# Patient Record
Sex: FEMALE | Race: OTHER | NOT HISPANIC OR LATINO
[De-identification: names, ages, dates, MRNs, and addresses within clinical notes are randomized per-mention and may not be internally consistent; named-entity substitution may affect disease eponyms.]

---

## 2018-11-12 ENCOUNTER — APPOINTMENT (OUTPATIENT)
Dept: ORTHOPEDIC SURGERY | Facility: CLINIC | Age: 48
End: 2018-11-12
Payer: COMMERCIAL

## 2018-11-12 VITALS
BODY MASS INDEX: 33.29 KG/M2 | HEART RATE: 79 BPM | WEIGHT: 195 LBS | DIASTOLIC BLOOD PRESSURE: 84 MMHG | TEMPERATURE: 98.1 F | SYSTOLIC BLOOD PRESSURE: 149 MMHG | HEIGHT: 64 IN

## 2018-11-12 DIAGNOSIS — Z82.61 FAMILY HISTORY OF ARTHRITIS: ICD-10-CM

## 2018-11-12 DIAGNOSIS — Z80.42 FAMILY HISTORY OF MALIGNANT NEOPLASM OF PROSTATE: ICD-10-CM

## 2018-11-12 DIAGNOSIS — Z82.62 FAMILY HISTORY OF OSTEOPOROSIS: ICD-10-CM

## 2018-11-12 DIAGNOSIS — M25.551 PAIN IN RIGHT HIP: ICD-10-CM

## 2018-11-12 DIAGNOSIS — Z86.79 PERSONAL HISTORY OF OTHER DISEASES OF THE CIRCULATORY SYSTEM: ICD-10-CM

## 2018-11-12 DIAGNOSIS — M25.552 PAIN IN RIGHT HIP: ICD-10-CM

## 2018-11-12 DIAGNOSIS — M16.0 BILATERAL PRIMARY OSTEOARTHRITIS OF HIP: ICD-10-CM

## 2018-11-12 PROCEDURE — 99203 OFFICE O/P NEW LOW 30 MIN: CPT | Mod: 25

## 2018-11-12 PROCEDURE — 73523 X-RAY EXAM HIPS BI 5/> VIEWS: CPT | Mod: RT

## 2018-11-12 PROCEDURE — 20610 DRAIN/INJ JOINT/BURSA W/O US: CPT | Mod: LT

## 2018-11-12 RX ORDER — LISINOPRIL 10 MG/1
10 TABLET ORAL
Refills: 0 | Status: ACTIVE | COMMUNITY

## 2018-11-12 RX ORDER — METOPROLOL SUCCINATE 100 MG/1
100 TABLET, EXTENDED RELEASE ORAL
Refills: 0 | Status: ACTIVE | COMMUNITY

## 2018-12-26 ENCOUNTER — OTHER (OUTPATIENT)
Age: 48
End: 2018-12-26

## 2018-12-31 ENCOUNTER — APPOINTMENT (OUTPATIENT)
Dept: MRI IMAGING | Facility: CLINIC | Age: 48
End: 2018-12-31

## 2019-01-09 ENCOUNTER — APPOINTMENT (OUTPATIENT)
Dept: MRI IMAGING | Facility: CLINIC | Age: 49
End: 2019-01-09

## 2019-02-06 ENCOUNTER — APPOINTMENT (OUTPATIENT)
Dept: ORTHOPEDIC SURGERY | Facility: CLINIC | Age: 49
End: 2019-02-06
Payer: COMMERCIAL

## 2019-02-06 VITALS
WEIGHT: 195 LBS | BODY MASS INDEX: 33.29 KG/M2 | DIASTOLIC BLOOD PRESSURE: 85 MMHG | HEART RATE: 68 BPM | HEIGHT: 64 IN | SYSTOLIC BLOOD PRESSURE: 131 MMHG

## 2019-02-06 PROCEDURE — 99214 OFFICE O/P EST MOD 30 MIN: CPT | Mod: 25

## 2019-02-06 PROCEDURE — 20610 DRAIN/INJ JOINT/BURSA W/O US: CPT | Mod: LT

## 2019-02-06 NOTE — ADDENDUM
[FreeTextEntry1] : I, Irish Paul, acted solely as a scribe for Dr. Jann Alaniz on this date 02/06/2019.

## 2019-02-06 NOTE — DISCUSSION/SUMMARY
[de-identified] : 49 year old female presents with mild arthritic change of the bilateral hips and left hip bursitis. We discussed the nature of the condition and the treatment options. The patient will proceed with conservative treatments until they have exhausted and have failed. I believe this would be the best option for long term relief. The patient elected for a left hip cortisone injection, which she received today and tolerated well. I encouraged the patient to continue low impact exercise. I referred the patient to a physiatrist, Dr. Guidry. I ordered an MRI of the lumbar spine. The patient will follow up as needed.

## 2019-02-06 NOTE — HISTORY OF PRESENT ILLNESS
[Pain Location] : pain [Stable] : stable [de-identified] : 49 year old female presents with bilateral hip pain, which started about one year ago. No injury but she was very active. She works as a . The patient used to power walk, but stopped one year ago. The pain is in the lateral hips, left worse then right. She denies groin pain. She has intermittent lower back pain. She describes the pain as an ache, more painful in the evening and upon walking. Rest and ibuprofen provide pain relief. Overuse of the hips, walking, and prolonged sitting aggravate her pain. She notes being unable to cross her legs without pain. She has sleep disturbances intermittently. The hip bursa cortisone injection from last visit provided good pain relief for 4 weeks.

## 2019-02-06 NOTE — PHYSICAL EXAM
[LE] : Sensory: Intact in bilateral lower extremities [ALL] : dorsalis pedis, posterior tibial, femoral, popliteal, and radial 2+ and symmetric bilaterally [Obese] : obese [Normal] : Oriented to person, place, and time, insight and judgement were intact and the affect was normal [Poor Appearance] : well-appearing [Acute Distress] : not in acute distress [de-identified] : GENERAL APPEARANCE: Well nourished and hydrated, pleasant, alert, and oriented x 3. Appears their stated age. \par HEENT: Normocephalic, extraocular eye motion intact. Nasal septum midline. Oral cavity clear. External auditory canal clear. \par RESPIRATORY: Breath sounds clear and audible in all lobes. No wheezing, No accessory muscle use.\par CARDIOVASCULAR: No apparent abnormalities. No lower leg edema. No varicosities. Pedal pulses are palpable.\par NEUROLOGIC: Sensation is normal, no muscle weakness in the upper or lower extremities.\par DERMATOLOGIC: No apparent skin lesions, moist, warm, no rash.\par SPINE: Cervical spine appears normal and moves freely; thoracic spine appears normal and moves freely; lumbosacral spine appears normal and moves freely, normal, nontender.\par MUSCULOSKELETAL: Hands, wrists, and elbows are normal and move freely, shoulders are normal and move freely. [de-identified] : Bilateral hip examination demonstrates FROM without groin pain, focal tenderness and pain over trochanteric bursa.  [de-identified] : MRI of the left hip done at [facility] on 1/28/19 demonstrates superficial greater trochanteric bursitis, insertional tendinosis of the gluteus minimus, small left hip effusion, blunting of the periphery of the superior labrum, atrophic change with signal abnormality in the quadratus femoris and narrowing of the ischial femoral space compatible with ischial femoral impingement, mild tendinosis of the left common hamstring origin, mild degenerative changes of the pubic symphysis, and scattered diverticula.

## 2019-02-06 NOTE — PROCEDURE
[de-identified] : I injected the patient's left hip with cortisone today.\par \par I discussed at length with the patient the planned steroid and lidocaine injection. The risks, benefits, convalescence and alternatives were reviewed. The possible side effects discussed included but were not limited to: pain, swelling, heat, bleeding, and redness. Symptoms are generally mild but if they are extensive then contact the office. Giving pain relievers by mouth such as NSAIDs or Tylenol can generally treat the reactions to steroid and lidocaine. Rare cases of infection have been noted. Rash, hives and itching may occur post injection. If you have muscle pain or cramps, flushing and or swelling of the face, rapid heart beat, nausea, dizziness, fever, chills, headache, difficulty breathing, swelling in the arms or legs, or have a prickly feeling of your skin, contact a health care provider immediately. Following this discussion, the knee was prepped with Alcohol and under sterile condition the 80 mg Depo-Medrol and 6 cc Lidocaine injection was performed with a 20 gauge needle through a superolateral injection site. The needle was introduced into the joint, aspiration was performed to ensure intra-articular placement and the medication was injected. Upon withdrawal of the needle the site was cleaned with alcohol and a band aid applied. The patient tolerated the injection well and there were no adverse effects. Post injection instructions included no strenuous activity for 24 hours, cryotherapy and if there are any adverse effects to contact the office.\par \par

## 2019-04-17 ENCOUNTER — OTHER (OUTPATIENT)
Age: 49
End: 2019-04-17

## 2019-05-22 ENCOUNTER — LABORATORY RESULT (OUTPATIENT)
Age: 49
End: 2019-05-22

## 2019-05-22 ENCOUNTER — APPOINTMENT (OUTPATIENT)
Dept: RHEUMATOLOGY | Facility: CLINIC | Age: 49
End: 2019-05-22
Payer: COMMERCIAL

## 2019-05-22 VITALS
HEART RATE: 82 BPM | SYSTOLIC BLOOD PRESSURE: 137 MMHG | BODY MASS INDEX: 33.29 KG/M2 | WEIGHT: 195 LBS | OXYGEN SATURATION: 97 % | HEIGHT: 64 IN | DIASTOLIC BLOOD PRESSURE: 86 MMHG | TEMPERATURE: 98.7 F

## 2019-05-22 DIAGNOSIS — M25.50 PAIN IN UNSPECIFIED JOINT: ICD-10-CM

## 2019-05-22 DIAGNOSIS — Z80.42 FAMILY HISTORY OF MALIGNANT NEOPLASM OF PROSTATE: ICD-10-CM

## 2019-05-22 PROCEDURE — 99204 OFFICE O/P NEW MOD 45 MIN: CPT

## 2019-05-22 RX ORDER — CHROMIUM 200 MCG
TABLET ORAL
Refills: 0 | Status: ACTIVE | COMMUNITY

## 2019-05-22 NOTE — REVIEW OF SYSTEMS
[Feeling Poorly] : feeling poorly [Feeling Tired] : feeling tired [Arthralgias] : arthralgias [As Noted in HPI] : as noted in HPI [Negative] : Heme/Lymph

## 2019-05-23 ENCOUNTER — APPOINTMENT (OUTPATIENT)
Dept: PHYSICAL MEDICINE AND REHAB | Facility: CLINIC | Age: 49
End: 2019-05-23
Payer: COMMERCIAL

## 2019-05-23 LAB
25(OH)D3 SERPL-MCNC: 19.4 NG/ML
ALBUMIN SERPL ELPH-MCNC: 4.8 G/DL
ALP BLD-CCNC: 40 U/L
ALT SERPL-CCNC: 19 U/L
ANION GAP SERPL CALC-SCNC: 15 MMOL/L
APPEARANCE: CLEAR
AST SERPL-CCNC: 15 U/L
B BURGDOR IGG+IGM SER QL IB: NORMAL
BASOPHILS # BLD AUTO: 0.03 K/UL
BASOPHILS NFR BLD AUTO: 0.4 %
BILIRUB SERPL-MCNC: 0.3 MG/DL
BILIRUBIN URINE: NEGATIVE
BLOOD URINE: NORMAL
BUN SERPL-MCNC: 18 MG/DL
CALCIUM SERPL-MCNC: 10.3 MG/DL
CCP AB SER IA-ACNC: <8 UNITS
CHLORIDE SERPL-SCNC: 103 MMOL/L
CK SERPL-CCNC: 60 U/L
CO2 SERPL-SCNC: 23 MMOL/L
COLOR: NORMAL
CREAT SERPL-MCNC: 0.63 MG/DL
CRP SERPL-MCNC: 0.88 MG/DL
DEPRECATED KAPPA LC FREE/LAMBDA SER: 1.23 RATIO
EOSINOPHIL # BLD AUTO: 0.09 K/UL
EOSINOPHIL NFR BLD AUTO: 1.1 %
ERYTHROCYTE [SEDIMENTATION RATE] IN BLOOD BY WESTERGREN METHOD: 35 MM/HR
FERRITIN SERPL-MCNC: 58 NG/ML
FOLATE SERPL-MCNC: 18.3 NG/ML
GLUCOSE QUALITATIVE U: NEGATIVE
GLUCOSE SERPL-MCNC: 102 MG/DL
HCT VFR BLD CALC: 41.7 %
HCV AB SER QL: NONREACTIVE
HCV S/CO RATIO: 0.1 S/CO
HGB BLD-MCNC: 12.9 G/DL
IGA SER QL IEP: 200 MG/DL
IGG SER QL IEP: 705 MG/DL
IGM SER QL IEP: 236 MG/DL
IMM GRANULOCYTES NFR BLD AUTO: 0.5 %
IRON SATN MFR SERPL: 13 %
IRON SERPL-MCNC: 52 UG/DL
KAPPA LC CSF-MCNC: 0.87 MG/DL
KAPPA LC SERPL-MCNC: 1.07 MG/DL
KETONES URINE: NEGATIVE
LEUKOCYTE ESTERASE URINE: NEGATIVE
LYMPHOCYTES # BLD AUTO: 2.05 K/UL
LYMPHOCYTES NFR BLD AUTO: 25.9 %
MAN DIFF?: NORMAL
MCHC RBC-ENTMCNC: 25.7 PG
MCHC RBC-ENTMCNC: 30.9 GM/DL
MCV RBC AUTO: 83.1 FL
MONOCYTES # BLD AUTO: 0.63 K/UL
MONOCYTES NFR BLD AUTO: 8 %
MYOGLOBIN SERPL-MCNC: <21 NG/ML
NEUTROPHILS # BLD AUTO: 5.06 K/UL
NEUTROPHILS NFR BLD AUTO: 64.1 %
NITRITE URINE: NEGATIVE
PH URINE: 5.5
PLATELET # BLD AUTO: 272 K/UL
POTASSIUM SERPL-SCNC: 3.8 MMOL/L
PROT SERPL-MCNC: 7.1 G/DL
PROTEIN URINE: NEGATIVE
RBC # BLD: 5.02 M/UL
RBC # FLD: 13.8 %
RF+CCP IGG SER-IMP: NEGATIVE
RHEUMATOID FACT SER QL: <10 IU/ML
SODIUM SERPL-SCNC: 141 MMOL/L
SPECIFIC GRAVITY URINE: 1.02
THYROGLOB AB SERPL-ACNC: <20 IU/ML
THYROPEROXIDASE AB SERPL IA-ACNC: <10 IU/ML
TIBC SERPL-MCNC: 387 UG/DL
TSH SERPL-ACNC: 1.73 UIU/ML
UIBC SERPL-MCNC: 335 UG/DL
UROBILINOGEN URINE: NORMAL
VIT B12 SERPL-MCNC: 587 PG/ML
WBC # FLD AUTO: 7.9 K/UL

## 2019-05-23 PROCEDURE — 99214 OFFICE O/P EST MOD 30 MIN: CPT

## 2019-05-23 RX ORDER — MELOXICAM 7.5 MG/1
7.5 TABLET ORAL TWICE DAILY
Qty: 30 | Refills: 0 | Status: COMPLETED | COMMUNITY
Start: 2019-05-23 | End: 2019-05-23

## 2019-05-24 LAB
ENA SS-A AB SER IA-ACNC: <0.2 AL
ENA SS-B AB SER IA-ACNC: <0.2 AL

## 2019-05-27 PROBLEM — M25.50 POLYARTHRALGIA: Status: ACTIVE | Noted: 2019-05-22

## 2019-05-27 LAB
ALDOLASE SERPL-CCNC: 12 U/L
ANA SER IF-ACNC: NEGATIVE
VIT B1 SERPL-MCNC: 104.6 NMOL/L
VIT B6 SERPL-MCNC: 26 UG/L

## 2019-05-27 NOTE — ASSESSMENT
[FreeTextEntry1] : 49 year-old female with sudden onset of bilateral leg pain that is constant and not responding to NSAIDS\par MRI was inconclusive\par She has a hx of carpal tunnel bilaterally\par Add labs for neuropathy X lyme\par Send for EMG\par \par will fu with this clinic after labs and emg are done\par \par does not wish to start any medications at this time - will stop B6 tea to see if symptoms improve

## 2019-05-27 NOTE — HISTORY OF PRESENT ILLNESS
[FreeTextEntry1] : patient developed bilateral leg pain with shooting/burning pain for 3-4 months\par Has numbness and tingling over hands and feet- told to have carpal tunnel in the past over the left hand\par MRi of lumbar spine done in 04/2019 with no stenosis or nerve compression - done at an open MRI machine - has not have any injections or other procedures\par Reports achiness throughout her joints - specially her hands\par Morning stiffness - feels myalgia over bilateral legs - improves slightly \par sometimes feels like her legs are tired. \par hair thinning\par severe fatigue\par weight loss of 15 lbs in a diet\par Patient is taking a herbal tea that contains high doses of B6\par \par Denies any fevers, chill, rashes, weight loss,fatigue,  hair loss,dry eyes or mouth, mouth sores, Raynaud's. chest pain or SOB, GI or \par \par

## 2019-05-27 NOTE — PHYSICAL EXAM
[General Appearance - Alert] : alert [General Appearance - In No Acute Distress] : in no acute distress [Heart Sounds] : normal S1 and S2 [Auscultation Breath Sounds / Voice Sounds] : lungs were clear to auscultation bilaterally [Heart Rate And Rhythm] : heart rate was normal and rhythm regular [Heart Sounds Gallop] : no gallops [Murmurs] : no murmurs [Heart Sounds Pericardial Friction Rub] : no pericardial rub [Full Pulse] : the pedal pulses are present [Bowel Sounds] : normal bowel sounds [Edema] : there was no peripheral edema [Abdomen Mass (___ Cm)] : no abdominal mass palpated [Abdomen Soft] : soft [Abdomen Tenderness] : non-tender [Cervical Lymph Nodes Enlarged Anterior Bilaterally] : anterior cervical [Cervical Lymph Nodes Enlarged Posterior Bilaterally] : posterior cervical [Supraclavicular Lymph Nodes Enlarged Bilaterally] : supraclavicular [Axillary Lymph Nodes Enlarged Bilaterally] : axillary [Femoral Lymph Nodes Enlarged Bilaterally] : femoral [Inguinal Lymph Nodes Enlarged Bilaterally] : inguinal [No CVA Tenderness] : no ~M costovertebral angle tenderness [Skin Turgor] : normal skin turgor [] : no rash [Skin Color & Pigmentation] : normal skin color and pigmentation [FreeTextEntry1] : right leg with mild weakness [Oriented To Time, Place, And Person] : oriented to person, place, and time [Affect] : the affect was normal [Impaired Insight] : insight and judgment were intact

## 2019-05-28 NOTE — ASSESSMENT
[FreeTextEntry1] : Ms. PHOENIX is a 49 year old with initially bilateral hip pain from bursitis - treated with hip injections with ortho.  with progressive development of lumbar back pain with radiculopathy to bilateral left > right extremity pain mostly along L5-S1 distribution.  Recommend conservative care with PT and diclofenac BID. In regards to her paresthesias in her UE, likely related to CTS, consider EMG for further diagnostic testing. Follow up with rheumatology regarding recent workup. Follow up after therapy.\par

## 2019-05-28 NOTE — PHYSICAL EXAM
[FreeTextEntry1] : General: Awake and alert in no acute distress\par Psych: normal mood and affect\par HEENT: NC/AT, normal visual tracking\par Pulmonary: no resp distress, chest expansion appears symmetrical\par CV: extremities are warm and perfused\par Abd: non-distended\par Ext: no c/c/e\par \par Lumbar/Hip Spine:\par Gait - non-antalgic\par Inspection: normal muscle bulk without asymmetry\par Tenderness to palpation: TTP to lumbar paraspinal, minimal over greater troch, no TTP over PSIS, sacroiliac joints, piriformis\par ROM: within functional limits - discomfort with flexion\par MMT: 5/5 bilateral lower extremities (HF, KE, KF, DF, PF, EHL) - pain with bilateral HF \par Reflexes: symmetric bilateral achilles and patella \par Sensory: intact to light touch in all dermatomes of the bilateral lower extremities\par Provocative testing:\par Clements's - positive \par SLR - negative - lower back discomfort\par GODWIN/FADIR - negative - right GODWIN with hip pain\par \par Cervical Spine/Shoulder:\par Inspection: normal muscle bulk without asymmetry\par Tenderness to palpation: no TTP bilateral thoracic paraspinals, medical scapular border, levator scapulae, upper trapezius, rhomboids, spinous process, AC joint, subacromial, biceps groove\par ROM: within functional limits\par MMT: 5/5 bilateral upper extremities\par Reflexes: symmetric bilateral biceps, triceps \par Sensory: intact to light touch in all dermatomes of the bilateral upper extremities\par Provocative testing:\par Spurling - negative\par Jean - negative

## 2019-05-28 NOTE — HISTORY OF PRESENT ILLNESS
[FreeTextEntry1] : Ms. JONNA PHOENIX is a 49 year old female here for initial evaluation of back pain.  Pain started 1 year ago without any inciting event.  Initially pain was only to bilateral hips to which she saw ortho and had injections to treat bursitis which helped.  However, 6 month ago pain started to radiate down bilateral left > right lower extremities along with some back pain.  Pain mostly to lateral thighs, occasionally to anterior aspect of thigh, poster calves and dorsum feet.  Pain to thighs described as a "zapping" and pulling, while feet pain is a burning and swelling numbness.  Bilateral thighs would feel fatigue at times without presence of weakness and denies fall history.  Pain worse with bending and walking, improves with stretching and sitting. have only been taking advil PRN at bedtime for pain.\par \par Subsequently, she has bilateral palm and finger numbness to nonspecific areas.  She consulted with rheum on 5/22 to r/o autoimmune disorders.  \par \par Location: bilateral (left >right) legs > back\par Inciting Event: none\par Exacerbating Factor: bending, walking\par Relieving factor: sit, rest\par Radiation: bilateral left> right legs\par Numbness/Tingling: bilateral feet\par Cough/sneeze: increase pain\par Bowel/Bladder incontinence:\par Extremity weakness: denies\par \par Prior Treatments\par Injections: bilateral hips for bursitis\par PT/OT: denies\par Images: 4/23/19 MRI [facility] - herniated disc to T11-12 and L5/S1, grade I listhesis at L4/5, and facet hypertrophy to L2-L5

## 2019-08-29 ENCOUNTER — APPOINTMENT (OUTPATIENT)
Dept: PHYSICAL MEDICINE AND REHAB | Facility: CLINIC | Age: 49
End: 2019-08-29

## 2019-10-02 ENCOUNTER — APPOINTMENT (OUTPATIENT)
Dept: ORTHOPEDIC SURGERY | Facility: CLINIC | Age: 49
End: 2019-10-02

## 2019-10-03 ENCOUNTER — APPOINTMENT (OUTPATIENT)
Dept: PHYSICAL MEDICINE AND REHAB | Facility: CLINIC | Age: 49
End: 2019-10-03
Payer: COMMERCIAL

## 2019-10-03 VITALS
HEIGHT: 64 IN | DIASTOLIC BLOOD PRESSURE: 72 MMHG | BODY MASS INDEX: 33.29 KG/M2 | WEIGHT: 195 LBS | SYSTOLIC BLOOD PRESSURE: 106 MMHG

## 2019-10-03 DIAGNOSIS — M54.16 RADICULOPATHY, LUMBAR REGION: ICD-10-CM

## 2019-10-03 DIAGNOSIS — G62.9 POLYNEUROPATHY, UNSPECIFIED: ICD-10-CM

## 2019-10-03 DIAGNOSIS — M70.62 TROCHANTERIC BURSITIS, RIGHT HIP: ICD-10-CM

## 2019-10-03 DIAGNOSIS — M70.61 TROCHANTERIC BURSITIS, RIGHT HIP: ICD-10-CM

## 2019-10-03 PROCEDURE — 99214 OFFICE O/P EST MOD 30 MIN: CPT

## 2019-10-03 RX ORDER — DICLOFENAC SODIUM 75 MG/1
75 TABLET, DELAYED RELEASE ORAL
Qty: 30 | Refills: 0 | Status: DISCONTINUED | COMMUNITY
Start: 2019-05-23 | End: 2019-10-03

## 2019-10-09 PROBLEM — G62.9 NEUROPATHY: Status: ACTIVE | Noted: 2019-05-22

## 2019-10-09 PROBLEM — M70.61 TROCHANTERIC BURSITIS OF BOTH HIPS: Status: ACTIVE | Noted: 2018-11-12

## 2019-10-09 PROBLEM — M54.16 LUMBAR BACK PAIN WITH RADICULOPATHY AFFECTING LEFT LOWER EXTREMITY: Status: ACTIVE | Noted: 2019-02-06

## 2019-10-09 NOTE — PHYSICAL EXAM
[FreeTextEntry1] : General: Awake and alert in no acute distress\par Psych: normal mood and affect\par HEENT: NC/AT, normal visual tracking\par Pulmonary: no resp distress, chest expansion appears symmetrical\par CV: extremities are warm and perfused\par Abd: non-distended\par Ext: no c/c/e\par Skin: normal color, appearance, and temperature\par \par Lumbar/Hip Spine:\par Gait - non-antalgic\par Inspection: normal muscle bulk without asymmetry\par Tenderness to palpation: TTP over bilateral lumbar paraspinal, greater trochanter, no TTP over PSIS, sacroiliac joints, piriformis\par ROM: within functional limits - discomfort with flexion\par MMT: 5/5 bilateral lower extremities (HF, KE, KF, DF, PF, EHL) - pain with bilateral HF \par Reflexes: symmetric bilateral achilles and patella \par Sensory: intact to light touch in all dermatomes of the bilateral lower extremities\par Provocative testing:\par Clements's - positive \par SLR - negative - discomfort to low back\par GODWIN/FADIR - negative - right GODWIN with hip pain

## 2019-10-09 NOTE — HISTORY OF PRESENT ILLNESS
[FreeTextEntry1] : Ms. JONNA PHOENIX is a 49 year old female here for follow up of back pain.  She was doing well over  then summer, however she reports experiencing debilitating pain on 8/25.  She was moving washer/dryer 2 days prior to onset.  Have been taking advil/motrin PRN.  Tried diclofenac with SE of fogginess and some nausea. She's complaining of constant pain and worse at night where she gets shooting pain to bilateral lateral thighs.  Unable to sleep 2/2 pain.  Has history of bilateral hip bursitis with relief after bursa injection.  Denies weakness, B/B incontinence, or saddle anesthesia.\par \par Location: bilateral legs > back\par Inciting Event: denies initial inciting event or injury - recent exacerbation elicit after pushing heavy object\par Exacerbating Factor: sitting, rest, walking, bending\par Relieving factor: NSAIDS\par Radiation: latera aspect of bilateral left> right thighs - worse in PM\par Numbness/Tingling: toes - bilaterally.  numbness to fingers - 2/2 CTS\par Cough/sneeze: increase pain\par Bowel/Bladder incontinence: denies\par Extremity weakness: denies\par \par Prior Treatments\par Injections: bilateral hips for bursitis\par PT/OT: denies\par Medication: NSAIDS PRN\par Images: 4/23/19 MRI [facility] - herniated disc to T11-12 and L5/S1, grade I listhesis at L4/5, and facet hypertrophy to L2-L5

## 2019-10-09 NOTE — ASSESSMENT
[FreeTextEntry1] : Ms. PHOENIX is a 49 year old with low back and bilateral hip pain - likely from lumbar radiculopathy to bilateral L5 nerve root.  She also has history of bilateral hip bursitis which may be contributing to some of pain. She had bilateral bursa injections with improvement.  She would like to continue with conservative management and overall holistic approach. Recommend starting physical therapy.  Referral for acupuncture provided.  In terms of medication, she can take NSAIDS PRN and agree to try neuropathic agent to help with nocturnal leg pain.  Gabapentin 300mg QHS order - medication use and side effect reviewed. Ordered for diagnostic EMG of bilateral lower extremity to further evaluate radiculitis and neuropathy.  Follow up after EMG and PT. \par

## 2019-10-25 ENCOUNTER — RX RENEWAL (OUTPATIENT)
Age: 49
End: 2019-10-25

## 2019-11-05 ENCOUNTER — APPOINTMENT (OUTPATIENT)
Dept: NEUROLOGY | Facility: CLINIC | Age: 49
End: 2019-11-05

## 2021-01-05 ENCOUNTER — APPOINTMENT (OUTPATIENT)
Dept: ANESTHESIOLOGY | Facility: CLINIC | Age: 51
End: 2021-01-05

## 2021-01-05 ENCOUNTER — OUTPATIENT (OUTPATIENT)
Dept: OUTPATIENT SERVICES | Facility: HOSPITAL | Age: 51
LOS: 1 days | End: 2021-01-05
Payer: COMMERCIAL

## 2021-01-05 DIAGNOSIS — M47.16 OTHER SPONDYLOSIS WITH MYELOPATHY, LUMBAR REGION: ICD-10-CM

## 2021-01-05 PROCEDURE — 64493 INJ PARAVERT F JNT L/S 1 LEV: CPT

## 2021-01-05 PROCEDURE — 64494 INJ PARAVERT F JNT L/S 2 LEV: CPT

## 2021-02-02 ENCOUNTER — INPATIENT (INPATIENT)
Facility: HOSPITAL | Age: 51
LOS: 3 days | Discharge: HOME | DRG: 376 | End: 2021-02-06
Attending: STUDENT IN AN ORGANIZED HEALTH CARE EDUCATION/TRAINING PROGRAM | Admitting: STUDENT IN AN ORGANIZED HEALTH CARE EDUCATION/TRAINING PROGRAM
Payer: COMMERCIAL

## 2021-02-02 VITALS
TEMPERATURE: 99 F | HEIGHT: 64 IN | DIASTOLIC BLOOD PRESSURE: 84 MMHG | WEIGHT: 197.98 LBS | HEART RATE: 95 BPM | OXYGEN SATURATION: 98 % | SYSTOLIC BLOOD PRESSURE: 125 MMHG | RESPIRATION RATE: 18 BRPM

## 2021-02-02 DIAGNOSIS — Z98.891 HISTORY OF UTERINE SCAR FROM PREVIOUS SURGERY: Chronic | ICD-10-CM

## 2021-02-02 DIAGNOSIS — Z90.49 ACQUIRED ABSENCE OF OTHER SPECIFIED PARTS OF DIGESTIVE TRACT: Chronic | ICD-10-CM

## 2021-02-02 DIAGNOSIS — K56.609 UNSPECIFIED INTESTINAL OBSTRUCTION, UNSPECIFIED AS TO PARTIAL VERSUS COMPLETE OBSTRUCTION: ICD-10-CM

## 2021-02-02 LAB
ALBUMIN SERPL ELPH-MCNC: 4.4 G/DL — SIGNIFICANT CHANGE UP (ref 3.3–5)
ALP SERPL-CCNC: 46 U/L — SIGNIFICANT CHANGE UP (ref 40–120)
ALT FLD-CCNC: 26 U/L — SIGNIFICANT CHANGE UP (ref 10–45)
ANION GAP SERPL CALC-SCNC: 15 MMOL/L — SIGNIFICANT CHANGE UP (ref 5–17)
APPEARANCE UR: CLEAR — SIGNIFICANT CHANGE UP
AST SERPL-CCNC: 18 U/L — SIGNIFICANT CHANGE UP (ref 10–40)
BACTERIA # UR AUTO: NEGATIVE — SIGNIFICANT CHANGE UP
BASOPHILS # BLD AUTO: 0.02 K/UL — SIGNIFICANT CHANGE UP (ref 0–0.2)
BASOPHILS NFR BLD AUTO: 0.2 % — SIGNIFICANT CHANGE UP (ref 0–2)
BILIRUB SERPL-MCNC: 0.3 MG/DL — SIGNIFICANT CHANGE UP (ref 0.2–1.2)
BILIRUB UR-MCNC: NEGATIVE — SIGNIFICANT CHANGE UP
BUN SERPL-MCNC: 16 MG/DL — SIGNIFICANT CHANGE UP (ref 7–23)
CALCIUM SERPL-MCNC: 9.6 MG/DL — SIGNIFICANT CHANGE UP (ref 8.4–10.5)
CHLORIDE SERPL-SCNC: 104 MMOL/L — SIGNIFICANT CHANGE UP (ref 96–108)
CO2 SERPL-SCNC: 20 MMOL/L — LOW (ref 22–31)
COLOR SPEC: SIGNIFICANT CHANGE UP
CREAT SERPL-MCNC: 0.59 MG/DL — SIGNIFICANT CHANGE UP (ref 0.5–1.3)
DIFF PNL FLD: ABNORMAL
EOSINOPHIL # BLD AUTO: 0.03 K/UL — SIGNIFICANT CHANGE UP (ref 0–0.5)
EOSINOPHIL NFR BLD AUTO: 0.4 % — SIGNIFICANT CHANGE UP (ref 0–6)
EPI CELLS # UR: 2 /HPF — SIGNIFICANT CHANGE UP
GLUCOSE SERPL-MCNC: 113 MG/DL — HIGH (ref 70–99)
GLUCOSE UR QL: NEGATIVE — SIGNIFICANT CHANGE UP
HCG SERPL-ACNC: <2 MIU/ML — SIGNIFICANT CHANGE UP
HCT VFR BLD CALC: 46.4 % — HIGH (ref 34.5–45)
HGB BLD-MCNC: 14.3 G/DL — SIGNIFICANT CHANGE UP (ref 11.5–15.5)
HYALINE CASTS # UR AUTO: 0 /LPF — SIGNIFICANT CHANGE UP (ref 0–7)
IMM GRANULOCYTES NFR BLD AUTO: 0.4 % — SIGNIFICANT CHANGE UP (ref 0–1.5)
KETONES UR-MCNC: SIGNIFICANT CHANGE UP
LEUKOCYTE ESTERASE UR-ACNC: NEGATIVE — SIGNIFICANT CHANGE UP
LYMPHOCYTES # BLD AUTO: 1.12 K/UL — SIGNIFICANT CHANGE UP (ref 1–3.3)
LYMPHOCYTES # BLD AUTO: 13.7 % — SIGNIFICANT CHANGE UP (ref 13–44)
MAGNESIUM SERPL-MCNC: 2.4 MG/DL — SIGNIFICANT CHANGE UP (ref 1.6–2.6)
MCHC RBC-ENTMCNC: 25 PG — LOW (ref 27–34)
MCHC RBC-ENTMCNC: 30.8 GM/DL — LOW (ref 32–36)
MCV RBC AUTO: 81.1 FL — SIGNIFICANT CHANGE UP (ref 80–100)
MONOCYTES # BLD AUTO: 0.46 K/UL — SIGNIFICANT CHANGE UP (ref 0–0.9)
MONOCYTES NFR BLD AUTO: 5.6 % — SIGNIFICANT CHANGE UP (ref 2–14)
NEUTROPHILS # BLD AUTO: 6.5 K/UL — SIGNIFICANT CHANGE UP (ref 1.8–7.4)
NEUTROPHILS NFR BLD AUTO: 79.7 % — HIGH (ref 43–77)
NITRITE UR-MCNC: NEGATIVE — SIGNIFICANT CHANGE UP
NRBC # BLD: 0 /100 WBCS — SIGNIFICANT CHANGE UP (ref 0–0)
PH UR: 5.5 — SIGNIFICANT CHANGE UP (ref 5–8)
PLATELET # BLD AUTO: 259 K/UL — SIGNIFICANT CHANGE UP (ref 150–400)
POTASSIUM SERPL-MCNC: 3.9 MMOL/L — SIGNIFICANT CHANGE UP (ref 3.5–5.3)
POTASSIUM SERPL-SCNC: 3.9 MMOL/L — SIGNIFICANT CHANGE UP (ref 3.5–5.3)
PROT SERPL-MCNC: 7.7 G/DL — SIGNIFICANT CHANGE UP (ref 6–8.3)
PROT UR-MCNC: NEGATIVE — SIGNIFICANT CHANGE UP
RBC # BLD: 5.72 M/UL — HIGH (ref 3.8–5.2)
RBC # FLD: 14.7 % — HIGH (ref 10.3–14.5)
RBC CASTS # UR COMP ASSIST: 1 /HPF — SIGNIFICANT CHANGE UP (ref 0–4)
SODIUM SERPL-SCNC: 139 MMOL/L — SIGNIFICANT CHANGE UP (ref 135–145)
SP GR SPEC: 1.02 — SIGNIFICANT CHANGE UP (ref 1.01–1.02)
UROBILINOGEN FLD QL: NEGATIVE — SIGNIFICANT CHANGE UP
WBC # BLD: 8.16 K/UL — SIGNIFICANT CHANGE UP (ref 3.8–10.5)
WBC # FLD AUTO: 8.16 K/UL — SIGNIFICANT CHANGE UP (ref 3.8–10.5)
WBC UR QL: 2 /HPF — SIGNIFICANT CHANGE UP (ref 0–5)

## 2021-02-02 PROCEDURE — 99285 EMERGENCY DEPT VISIT HI MDM: CPT

## 2021-02-02 PROCEDURE — 74177 CT ABD & PELVIS W/CONTRAST: CPT | Mod: 26,MA

## 2021-02-02 RX ORDER — LIDOCAINE 4 G/100G
10 CREAM TOPICAL ONCE
Refills: 0 | Status: COMPLETED | OUTPATIENT
Start: 2021-02-02 | End: 2021-02-02

## 2021-02-02 RX ORDER — METOPROLOL TARTRATE 50 MG
1 TABLET ORAL
Qty: 0 | Refills: 0 | DISCHARGE

## 2021-02-02 RX ORDER — LOSARTAN POTASSIUM 100 MG/1
1 TABLET, FILM COATED ORAL
Qty: 0 | Refills: 0 | DISCHARGE

## 2021-02-02 RX ORDER — ONDANSETRON 8 MG/1
4 TABLET, FILM COATED ORAL ONCE
Refills: 0 | Status: COMPLETED | OUTPATIENT
Start: 2021-02-02 | End: 2021-02-02

## 2021-02-02 RX ORDER — ACETAMINOPHEN 500 MG
975 TABLET ORAL ONCE
Refills: 0 | Status: COMPLETED | OUTPATIENT
Start: 2021-02-02 | End: 2021-02-02

## 2021-02-02 RX ORDER — FAMOTIDINE 10 MG/ML
20 INJECTION INTRAVENOUS ONCE
Refills: 0 | Status: COMPLETED | OUTPATIENT
Start: 2021-02-02 | End: 2021-02-02

## 2021-02-02 RX ADMIN — FAMOTIDINE 20 MILLIGRAM(S): 10 INJECTION INTRAVENOUS at 19:03

## 2021-02-02 RX ADMIN — ONDANSETRON 4 MILLIGRAM(S): 8 TABLET, FILM COATED ORAL at 19:00

## 2021-02-02 RX ADMIN — Medication 975 MILLIGRAM(S): at 19:42

## 2021-02-02 RX ADMIN — LIDOCAINE 10 MILLILITER(S): 4 CREAM TOPICAL at 19:07

## 2021-02-02 NOTE — ED PROVIDER NOTE - OBJECTIVE STATEMENT
51F with PMHx of HTN, and chronic constipation presents with abd pain associated with one episode of emesis this morning. Reports two months of vague, generalized, non-radiating, mostly upper abdominal pain. Has seen GI at outpatient facility with recent CT abd/pelvis that was remarkable for large stool burden. Was started on a bowel regimen 5 weeks ago. BID Murelax, enema two times a week alternating with suppository. PT presents to ER today with worsening epigastric pain that is constant with intermittent cramping associated with burning sensation of chest provoked by eating. Has been endorsing bowel regimen, however, states she feel like she still has stool burden. Spoke to GI physician who instructed pt to come to ER. Denies fever, chills, melena, hematochezia, increase in diarrhea (baseline is soft stool due to regimen), SOB, URI sx, Urinary sx, or abnormal vaginal bleeding. LMP: 3 weeks ago. PT is perimenopausal. Of note, pt has colonoscopy and endoscopy scheduled next week. 51F with PMHx of HTN, and chronic constipation presents with abd pain associated with one episode of emesis this morning. Reports two months of vague, generalized, non-radiating, mostly upper abdominal pain. Has seen GI at outpatient facility with recent CT abd/pelvis that was remarkable for large stool burden. Was started on a bowel regimen 5 weeks ago. BID Murelax, enema two times a week alternating with suppository. PT presents to ER today with worsening epigastric pain that is constant with intermittent cramping associated with burning sensation of chest provoked by eating. Has been endorsing bowel regimen, however, states she feel like she still has stool burden. Spoke to GI physician who instructed pt to come to ER. Denies fever, chills, melena, hematochezia, increase in diarrhea (baseline is soft stool due to regimen), SOB, URI sx, Urinary sx, or abnormal vaginal bleeding, dysphagia, odynophagia. LMP: 3 weeks ago. PT is perimenopausal. Of note, pt has colonoscopy and endoscopy scheduled next week.

## 2021-02-02 NOTE — ED ADULT NURSE REASSESSMENT NOTE - NS ED NURSE REASSESS COMMENT FT1
Patient resting in bed, states abdominal pain is 6/10 better then earlier, pain medication administered as per MD order. No questions or concerns as this time, pending CT scan. Will continue to reassess. Call bell with in reach.

## 2021-02-02 NOTE — ED PROVIDER NOTE - CLINICAL SUMMARY MEDICAL DECISION MAKING FREE TEXT BOX
51F with chronic abd pain, and constipation presents with worsening abd pain, epigastric pain that is described as burning sensation radiating to chest, worsened with food. Notes abd pain with one episode of emesis. Hx of cholecystectomy. Will r/o SBO, and pancreatitis. Do not suspect aortic dissection for atypical acs at this time. Will obtain CT abd/pelvis with PO contrast, GI cocktail, and reevaluate.

## 2021-02-02 NOTE — H&P ADULT - NSHPPHYSICALEXAM_GEN_ALL_CORE
PHYSICAL EXAM:  GENERAL: NAD  HEAD:  Atraumatic, Normocephalic  EYES: EOMI, PERRLA  NECK: Supple  CHEST/LUNG: Unlabored respirations  HEART: Regular rate and rhythm  ABDOMEN: Softly distended, LUQ TTP. No rebound or guarding.   NEURO: A&Ox3

## 2021-02-02 NOTE — ED PROVIDER NOTE - PRO INTERPRETER NEED 2
" EMERGENCY DEPARTMENT ENCOUNTER    CHIEF COMPLAINT  Chief Complaint: SOA  History given by: pt  History limited by: nothing  Room Number: 26/26  PMD: Melina Kothari MD      HPI:  Pt is a 31 y.o. male with h/o SVC [had surgery on 05/17/18] who presents complaining of SOA that began a \"few\" weeks ago. Pt also complains of a cough, intermittent palpitations that lasted for \"two to three seconds\" that worsens when laying down, and chest tightness. Pt denies diarrhea, fever, or chills. Pt was seen in the [facility] ED on 06/11/18 and the CT showed a pericardial effusion. Pt is currently on Lovenox and 81 mg ASA. Pt is also using his incentive spirometer. Pt has no other complaints at this time.    Duration: Began a \"few\" weeks ago  Onset: gradual  Timing: constant  Quality: SOA  Intensity/Severity: moderate  Associated Symptoms: cough, intermittent palpitations, and chest tightness  Treatment before arrival: Pt was seen in the [facility] ED on 06/11/18 and the CT showed a pericardial effusion. Pt is currently on Lovenox and 81 mg ASA. Pt is also using his incentive spirometer.    PAST MEDICAL HISTORY  Active Ambulatory Problems     Diagnosis Date Noted   • SVC syndrome 02/25/2017   • Diffuse large B cell lymphoma 02/26/2017   • Balance problem 08/21/2017   • Neuropathy 08/21/2017   • Blurry vision, bilateral 09/11/2017   • Generalized anxiety disorder 09/11/2017   • Seasonal allergic rhinitis 09/14/2017   • Primary hypothyroidism 10/12/2017   • Cold intolerance 10/12/2017   • Chronic fatigue 10/12/2017   • Primary insomnia 10/13/2017     Resolved Ambulatory Problems     Diagnosis Date Noted   • No Resolved Ambulatory Problems     Past Medical History:   Diagnosis Date   • Anxiety    • GERD (gastroesophageal reflux disease)    • Hypothyroidism    • Lymphoma    • Superior vena cava syndrome    • TIA (transient ischemic attack)        PAST SURGICAL HISTORY  Past Surgical History:   Procedure " Laterality Date   • BRACHIOCEPHALIC VEIN ANGIOPLASTY / STENTING Bilateral     no stents were able to be placed   • BYPASS GRAFT  05/17/2018    Heart surgery bypass; Left IJ to right atrium   • CHEST TUBE INSERTION Right     after thoracentesis   • EXCISION MASS HEAD/NECK Left 2/27/2017    Procedure: Excisional biopsy of left occipital lymph node;  Surgeon: Catalino Damon MD;  Location: [facility];  Service:    • RIB BIOPSY Right 2011    MEDIASTINUM   • SKIN BIOPSY      cyst on left shoulder, benign   • THORACENTESIS Right     thora drain left in for 6 months       FAMILY HISTORY  Family History   Problem Relation Age of Onset   • Cancer Maternal Grandmother    • Cancer Maternal Grandfather    • Cancer Paternal Grandmother    • Diabetes Paternal Grandmother    • Cancer Paternal Grandfather        SOCIAL HISTORY  Social History     Social History   • Marital status: Single     Spouse name: N/A   • Number of children: N/A   • Years of education: College     Occupational History   • Physical therapy tech Other [employer]     Social History Main Topics   • Smoking status: Never Smoker   • Smokeless tobacco: Never Used   • Alcohol use Yes      Comment: occasional, once a month   • Drug use: No   • Sexual activity: Yes     Partners: Male     Other Topics Concern   • Not on file     Social History Narrative    Lives at home with his dog.  Works as a Physical Therapist.         ALLERGIES  Contrast dye and Zoloft [sertraline hcl]    REVIEW OF SYSTEMS  Review of Systems   Constitutional: Negative for activity change, appetite change, chills and fever.   HENT: Negative for congestion and sore throat.    Eyes: Negative.    Respiratory: Positive for cough, chest tightness and shortness of breath.    Cardiovascular: Positive for palpitations (intermittent). Negative for chest pain and leg swelling.   Gastrointestinal: Negative for abdominal pain, diarrhea and vomiting.   Endocrine: Negative.    Genitourinary:  Negative for decreased urine volume and dysuria.   Musculoskeletal: Negative for neck pain.   Skin: Negative for rash and wound.   Allergic/Immunologic: Negative.    Neurological: Negative for weakness, numbness and headaches.   Hematological: Negative.    Psychiatric/Behavioral: Negative.    All other systems reviewed and are negative.      PHYSICAL EXAM  ED Triage Vitals [06/19/18 0802]   Temp Heart Rate Resp BP SpO2   96.8 °F (36 °C) 116 20 -- 98 %      Temp src Heart Rate Source Patient Position BP Location FiO2 (%)   Tympanic -- -- -- --       Physical Exam   Constitutional: He is oriented to person, place, and time. No distress.   HENT:   Head: Normocephalic and atraumatic.   Eyes: EOM are normal. Pupils are equal, round, and reactive to light.   Neck: Normal range of motion. Neck supple. No JVD present.   Cardiovascular: Normal rate, regular rhythm and normal heart sounds.    The medial incision looks unremarkable.   Pulmonary/Chest: Effort normal and breath sounds normal. No respiratory distress.   Abdominal: Soft. There is no tenderness. There is no rebound and no guarding.   Musculoskeletal: Normal range of motion. He exhibits no edema.   Neurological: He is alert and oriented to person, place, and time. He has normal sensation and normal strength.   Skin: Skin is warm and dry.   Psychiatric: Mood and affect normal.   Nursing note and vitals reviewed.      LAB RESULTS  Lab Results (last 24 hours)     Procedure Component Value Units Date/Time    Protime-INR [[id number]]  (Abnormal) Collected:  06/19/18 0841    Specimen:  Blood Updated:  06/19/18 0900     Protime 14.2 Seconds      INR 1.12 (H)    aPTT [[id number]]  (Abnormal) Collected:  06/19/18 0841    Specimen:  Blood Updated:  06/19/18 0900     PTT 47.9 (H) seconds     CBC & Differential [[id number]] Collected:  06/19/18 0842    Specimen:  Blood Updated:  06/19/18 0853    Narrative:       The following orders were created for panel order CBC &  Differential.  Procedure                               Abnormality         Status                     ---------                               -----------         ------                     CBC Auto Differential[[id number]]        Abnormal            Final result                 Please view results for these tests on the individual orders.    Comprehensive Metabolic Panel [[id number]]  (Abnormal) Collected:  06/19/18 0842    Specimen:  Blood Updated:  06/19/18 0907     Glucose 106 (H) mg/dL      BUN 15 mg/dL      Creatinine 0.97 mg/dL      Sodium 139 mmol/L      Potassium 4.0 mmol/L      Chloride 101 mmol/L      CO2 25.8 mmol/L      Calcium 9.5 mg/dL      Total Protein 7.7 g/dL      Albumin 4.00 g/dL      ALT (SGPT) 22 U/L      AST (SGOT) 15 U/L      Alkaline Phosphatase 86 U/L      Total Bilirubin 0.3 mg/dL      eGFR Non African Amer 89 mL/min/1.73      Globulin 3.7 gm/dL      A/G Ratio 1.1 g/dL      BUN/Creatinine Ratio 15.5     Anion Gap 12.2 mmol/L     BNP [[id number]]  (Normal) Collected:  06/19/18 0842    Specimen:  Blood Updated:  06/19/18 0905     proBNP 321.3 pg/mL     Narrative:       Among patients with dyspnea, NT-proBNP is highly sensitive for the detection of acute congestive heart failure. In addition NT-proBNP of <300 pg/ml effectively rules out acute congestive heart failure with 99% negative predictive value.    Troponin [[id number]]  (Normal) Collected:  06/19/18 0842    Specimen:  Blood Updated:  06/19/18 0907     Troponin T <0.010 ng/mL     Narrative:       Troponin T Reference Ranges:  Less than 0.03 ng/mL:    Negative for AMI  0.03 to 0.09 ng/mL:      Indeterminant for AMI  Greater than 0.09 ng/mL: Positive for AMI    CBC Auto Differential [[id number]]  (Abnormal) Collected:  06/19/18 0842    Specimen:  Blood Updated:  06/19/18 0853     WBC 5.44 10*3/mm3      RBC 4.75 10*6/mm3      Hemoglobin 12.7 (L) g/dL      Hematocrit 39.4 (L) %      MCV 82.9 fL      MCH 26.7 (L) pg      MCHC 32.2 (L) g/dL       RDW 13.6 %      RDW-SD 41.5 fl      MPV 9.1 fL      Platelets 260 10*3/mm3      Neutrophil % 54.2 %      Lymphocyte % 23.9 %      Monocyte % 6.8 %      Eosinophil % 13.6 (H) %      Basophil % 1.5 %      Immature Grans % 1.3 (H) %      Neutrophils, Absolute 2.95 10*3/mm3      Lymphocytes, Absolute 1.30 10*3/mm3      Monocytes, Absolute 0.37 10*3/mm3      Eosinophils, Absolute 0.74 (H) 10*3/mm3      Basophils, Absolute 0.08 10*3/mm3      Immature Grans, Absolute 0.07 (H) 10*3/mm3           I ordered the above labs and reviewed the results    RADIOLOGY  XR Chest 1 View   Final Result   The right lung is clear. There is cardiac enlargement. There are median sternotomy wires and vascular markers in position. There is opacity at the left lung base with blunting of the costophrenic angle consistent with left-sided pleural effusion with perhaps associated atelectasis/infiltrate. No pulmonary edema identified. There are monitoring leads superimposing the chest. The mediastinum is stable. The remainder is unremarkable.        I ordered the above noted radiological studies. Interpreted by radiologist. Reviewed by me in PACS.       PROCEDURES  Procedures  EKG           EKG time: 0853  Rhythm/Rate: NSR, 90  P waves and NH: nml; nml  QRS, axis: nml; nml   ST and T waves: nml     Interpreted Contemporaneously by me, independently viewed with no prior for comparison.    PROGRESS AND CONSULTS  0832 Ordered CXR, Transthoracic Echo, and blood work for further evaluation. Also ordered IVF for hydration.    0844 Received a call from Dr. Renteria, cardiology, and discussed pt's case. Dr. Renteria agreed with plan to order a transthoracic echo for further evaluation.    1110 Received a call from Dr. Renteria, cardiology, and discussed pt's case. Dr. Renteria stated that the Echo showed a small pericardial effusion. Advised that the pt should f/u in the office next week.    1112 Rechecked pt who is in NAD. Discussed unremarkable lab  results and the Echo which showed a small pericardial effusion. Also dicussed the plan for discharge. Advised the pt to f/u with a cardiologist. Pt understands and agrees with the plan, all questions answered.      MEDICAL DECISION MAKING  Results were reviewed/discussed with the patient and they were also made aware of online access. Pt also made aware that some labs, such as cultures, will not be resulted during ER visit and follow up with PMD is necessary.     MDM  Number of Diagnoses or Management Options     Amount and/or Complexity of Data Reviewed  Clinical lab tests: reviewed and ordered (Unremarkable)  Tests in the radiology section of CPT®: ordered and reviewed (CXR shows that the right lung is clear. There is cardiac enlargement. There are median sternotomy wires and vascular markers in position. There is opacity at the left lung base with blunting of the costophrenic angle consistent with left-sided pleural effusion with perhaps associated atelectasis/infiltrate. No pulmonary edema identified. There are monitoring leads superimposing the chest. The mediastinum is stable. The remainder is unremarkable.)  Tests in the medicine section of CPT®: reviewed and ordered (See procedure notes for EKG interpretation.)  Decide to obtain previous medical records or to obtain history from someone other than the patient: yes  Review and summarize past medical records: yes (The patient was seen in the ED on 06/11/18 for SOA and a cough. The CT scan showed only some postoperative pericardial effusion that was small, mild pleural effusion with atelectasis and possible stone in the gallbladder neck.The patient was advised to continue to use his incentive spirometer. The patient was also given a prescription for Tramadol and was advised to f/u with his PCP. The patient recently had a graft placed (at the [facility]) where he had issues with superior vena cava syndrome.)  Discuss the patient with other providers: yes (  Myra, cardiology)  Independent visualization of images, tracings, or specimens: yes    Patient Progress  Patient progress: stable         DIAGNOSIS  Final diagnoses:   Pericardial effusion       DISPOSITION  DISCHARGE    Patient discharged in stable condition.    Reviewed implications of results, diagnosis, meds, responsibility to follow up, warning signs and symptoms of possible worsening, potential complications and reasons to return to ER, including any new or worsening symptoms.    Patient/Family voiced understanding of above instructions.    Discussed plan for discharge, as there is no emergent indication for admission. Patient referred to primary care provider for BP management due to today's BP. Pt/family is agreeable and understands need for follow up and repeat testing.  Pt is aware that discharge does not mean that nothing is wrong but it indicates no emergency is present that requires admission and they must continue care with follow-up as given below or physician of their choice.     FOLLOW-UP  Carey Renteria MD  [address] [id number] [phone]    Schedule an appointment as soon as possible for a visit       [facility] Emergency Department  [address] [place] [address] [phone]    If symptoms worsen         Medication List      Changed    naproxen sodium 220 MG tablet  Commonly known as:  ALEVE  Take 2 tablets by mouth 2 (Two) Times a Day As Needed (chest pain).  What changed:  how much to take  reasons to take this        Stop    aspirin 81 MG chewable tablet              Latest Documented Vital Signs:  As of 3:51 PM  BP- 116/79 HR- 87 Temp- 96.8 °F (36 °C) (Tympanic) O2 sat- 99%    --  Documentation assistance provided by kirsty Russo for Dr. Santiago.  Information recorded by the kirsty was done at my direction and has been verified and validated by me.     Varsha Russo  06/19/18 1119       Robinson Santiago,  MD  06/19/18 1555     English

## 2021-02-02 NOTE — ED PROVIDER NOTE - PHYSICAL EXAMINATION
GEN: Pt in NAD, A&O x3.  PSYCH: Affect appropriate.  EYES: Sclera white w/o injection.   ENT: Head NCAT. MMM. Neck supple FROM. Tolerating PO sips of water, no dysphagia.  RESP: CTA b/l, no wheezes, rales, or rhonchi.   CARDIAC: RRR, clear distinct S1, S2, no appreciable murmurs.  ABD: Abdomen soft, epigastric tenderness, no rebound or guarding. No CVAT b/l.  VASC: 2+ radial and dorsalis pedis pulses b/l. No edema or calf tenderness.  SKIN: No rashes on the trunk.

## 2021-02-02 NOTE — H&P ADULT - NSHPLABSRESULTS_GEN_ALL_CORE
14.3   8.16  )-----------( 259      ( 2021 18:40 )             46.4       02-    139  |  104  |  16  ----------------------------<  113<H>  3.9   |  20<L>  |  0.59    Ca    9.6      2021 18:40  Mg     2.4     -    TPro  7.7  /  Alb  4.4  /  TBili  0.3  /  DBili  x   /  AST  18  /  ALT  26  /  AlkPhos  46  02-              Urinalysis Basic - ( 2021 18:40 )    Color: Light Yellow / Appearance: Clear / S.021 / pH: x  Gluc: x / Ketone: Trace  / Bili: Negative / Urobili: Negative   Blood: x / Protein: Negative / Nitrite: Negative   Leuk Esterase: Negative / RBC: 1 /hpf / WBC 2 /HPF   Sq Epi: x / Non Sq Epi: 2 /hpf / Bacteria: Negative        CT Abdomen and Pelvis w/ Oral Cont and w/ IV Cont:   EXAM:  CT ABDOMEN AND PELVIS OC IC                            PROCEDURE DATE:  2021            INTERPRETATION:  CLINICAL INFORMATION: 51-year-old female with vomiting. History of chronic constipation    COMPARISON: None.    PROCEDURE:  CT of the Abdomen and Pelvis was performed with intravenous contrast.  Intravenous contrast: 90 ml Omnipaque 350. 10 ml discarded.  Oral contrast: positive contrast was administered.  Sagittal and coronal reformats were performed.    FINDINGS:  LOWER CHEST:Within normal limits.    LIVER: Steatosis. Hypodensity posterior liver dome (segment 7)  BILE DUCTS: Normal caliber.  GALLBLADDER: Cholecystectomy.  SPLEEN: Within normal limits.  PANCREAS: Within normal limits.  ADRENALS: Within normal limits.  KIDNEYS/URETERS: Within normal limits.    BLADDER: Within normal limits.  REPRODUCTIVE ORGANS: Uterus and adnexa within normal limits.    BOWEL: Large bowel obstruction to the level of the sigmoid colon with question of a lipoma near the site of transition (601:89 and 602:52) Secondary small bowel dilatation. Collapsed distal sigmoid colon. Cecum measures 6.6 cm. Appendix normal.  PERITONEUM: No ascites.  VESSELS: Within normal limits.  RETROPERITONEUM/LYMPH NODES: No lymphadenopathy.  ABDOMINAL WALL:Within normal limits.  BONES: Within normal limits.    IMPRESSION:  Large bowel obstruction with transition at the level of the sigmoid colon and question of associated lipoma.  Secondary small bowel dilatation  Indeterminant liver lesion. Suggest follow-up MRI of the liver. 14.3   8.16  )-----------( 259      ( 2021 18:40 )             46.4       02-    139  |  104  |  16  ----------------------------<  113<H>  3.9   |  20<L>  |  0.59    Ca    9.6      2021 18:40  Mg     2.4     -    TPro  7.7  /  Alb  4.4  /  TBili  0.3  /  DBili  x   /  AST  18  /  ALT  26  /  AlkPhos  46  02-              Urinalysis Basic - ( 2021 18:40 )    Color: Light Yellow / Appearance: Clear / S.021 / pH: x  Gluc: x / Ketone: Trace  / Bili: Negative / Urobili: Negative   Blood: x / Protein: Negative / Nitrite: Negative   Leuk Esterase: Negative / RBC: 1 /hpf / WBC 2 /HPF   Sq Epi: x / Non Sq Epi: 2 /hpf / Bacteria: Negative      CT Abdomen and Pelvis w/ Oral Cont and w/ IV Cont:   EXAM:  CT ABDOMEN AND PELVIS OC IC                            PROCEDURE DATE:  2021            INTERPRETATION:  CLINICAL INFORMATION: 51-year-old female with vomiting. History of chronic constipation    COMPARISON: None.    PROCEDURE:  CT of the Abdomen and Pelvis was performed with intravenous contrast.  Intravenous contrast: 90 ml Omnipaque 350. 10 ml discarded.  Oral contrast: positive contrast was administered.  Sagittal and coronal reformats were performed.    FINDINGS:  LOWER CHEST:Within normal limits.    LIVER: Steatosis. Hypodensity posterior liver dome (segment 7)  BILE DUCTS: Normal caliber.  GALLBLADDER: Cholecystectomy.  SPLEEN: Within normal limits.  PANCREAS: Within normal limits.  ADRENALS: Within normal limits.  KIDNEYS/URETERS: Within normal limits.    BLADDER: Within normal limits.  REPRODUCTIVE ORGANS: Uterus and adnexa within normal limits.    BOWEL: Large bowel obstruction to the level of the sigmoid colon with question of a lipoma near the site of transition (601:89 and 602:52) Secondary small bowel dilatation. Collapsed distal sigmoid colon. Cecum measures 6.6 cm. Appendix normal.  PERITONEUM: No ascites.  VESSELS: Within normal limits.  RETROPERITONEUM/LYMPH NODES: No lymphadenopathy.  ABDOMINAL WALL:Within normal limits.  BONES: Within normal limits.    IMPRESSION:  Large bowel obstruction with transition at the level of the sigmoid colon and question of associated lipoma.  Secondary small bowel dilatation  Indeterminant liver lesion. Suggest follow-up MRI of the liver.

## 2021-02-02 NOTE — H&P ADULT - ATTENDING COMMENTS
Pt with no flatus or BM since yesterday and abd pain with an episode of vomiting.   Last colonoscopy many years ago.     aaox3  abd softly distended, no peritonitis    CT with LBO at the sigmoid colon with SB dilation    NGT, NPO, IVF  GI for flex sig and possible stent

## 2021-02-02 NOTE — ED ADULT NURSE REASSESSMENT NOTE - NS ED NURSE REASSESS COMMENT FT1
Pt returned from CT scan, VSS, reports improvement in condition, bed locked and in lowest position, call bell within reach, pending imaging results.

## 2021-02-02 NOTE — ED PROVIDER NOTE - PROGRESS NOTE DETAILS
Discussed case with GI fellow SHERIDAN - recommending rectal tube. Surgery has evaluted and has placed ngt. Patient tolerated both procedures well. Accepted by surgery. GI to see in AM.

## 2021-02-02 NOTE — H&P ADULT - ASSESSMENT
51F with PMHx of HTN, and chronic constipation presents with 1 day of worsening abd pain associated with one episode of emesis in the setting of recent GI workup. CT w/ evidence of LBO with concurrent SBO and hepatic lesion c/f malignancy. Pt HD stable w/o evidence of peritonitis.     - Admit to Dr. Segovia   - Pt w/ LBO and likely incompetent ileocecal valve given SBO  - Consult GI for possible stent placement   - NPO/IVF/NGT   - F/u CEA, CA 19-9   - F/u CT chest   - Serial abdominal exams   - Examine before pain meds   - Discussed with Dr. Luca Dumont PGY2   [facility] Surgery   [id number]        51F with PMHx of HTN, and chronic constipation presents with 1 day of worsening abd pain associated with one episode of emesis in the setting of recent GI workup. CT w/ evidence of LBO with concurrent SBO and hepatic lesion c/f malignancy. Pt HD stable w/o evidence of peritonitis.     - Admit to Dr. Segovia   - Pt w/ LBO and likely incompetent ileocecal valve given SBO  - Consult GI for possible stent placement. Spoke w/ GI fellow Dr. Nam - reviewed imaging, no plan for emergent intervention. Will see pt in AM.   - NPO/IVF/NGT   - F/u CEA, CA 19-9   - F/u CT chest   - Serial abdominal exams   - Examine before pain meds   - Discussed with Dr. Luca Dumont PGY2   [facility] Surgery   [id number]

## 2021-02-02 NOTE — H&P ADULT - HISTORY OF PRESENT ILLNESS
51F with PMHx of HTN, and chronic constipation presents with abd pain associated with one episode of emesis this morning. Reports two months of vague, generalized, non-radiating, mostly upper abdominal pain. Has seen GI at outpatient facility with recent CT abd/pelvis that was remarkable for large stool burden. Was started on a bowel regimen 5 weeks ago. BID Murelax, enema two times a week alternating with suppository. PT presents to ER today with worsening epigastric pain that is constant with intermittent cramping associated with burning sensation of chest provoked by eating. Has been endorsing bowel regimen, however, states she feel like she still has stool burden. Spoke to GI physician who instructed pt to come to ER. Denies fever, chills, melena, hematochezia, increase in diarrhea (baseline is soft stool due to regimen), SOB, URI sx, Urinary sx, or abnormal vaginal bleeding, dysphagia, odynophagia. LMP: 3 weeks ago. PT is perimenopausal. Of note, pt has colonoscopy and endoscopy scheduled next week. 51F with PMHx of HTN, and chronic constipation presents with abd pain associated with one episode of emesis this morning. Per patient first noted symptoms 2 months ago when she gradually noted mild, generalized abdominal pain/discomfort. Shes states her appetite slowly decreased over this period of time. Pt reports that a month ago she was seen by GI Dr. Hobson,  and was started on bowel regimen, however she continued to have "strange, feathery" bowel movements. D/t presistent symptoms a  CT abd/pelvis performed and per pt notable for high stool burden and narrowing in the colon. Plan was made for colonoscopy, however pt presents to ER today with worsening intermittent epigastric pain and cramping associated NBNB emesis. Pt states symptoms acutely worsened this morning and she has been tolerating minimal PO. Last flatus and BM this this AM but was not normal for her, describes as paste-like consistency, no melena/hematochezia. Denies fever, chills, melena, hematochezia SOB, URI sx, Urinary sx, or abnormal vaginal bleeding, dysphagia, odynophagia.     In ED pt afebrile and HD stable. Labs without leucocytosis. CT a/p w/ evidence of LBO with transition at the level of the sigmoid colon and question of associated lipoma as well as secondary small bowel dilatation and indeterminant liver lesion.  Pt states she had an upper endoscopy and colonoscopy "many years ago", with no significant findings. Of note, pt has colonoscopy and endoscopy scheduled next week with Dr. Hobson. LMP: 3 weeks ago (notes that she is perimenopausal).

## 2021-02-02 NOTE — ED ADULT NURSE NOTE - OBJECTIVE STATEMENT
52 y/o female presenting to the ER c/o upper abd pain and constipation x 5 weeks. Pt states "I've been constipated for about 5 weeks now, I had a CT done 3 weeks ago which showed I am full of stool in my intestines, I've been taking miralax, enemas, suppositories and only a small amount of soft stool comes out,. I threw up 2x today and have this burning in my upper chest and what feels like a lump in my throat. I'm nervous that something serious is wrong, I talked to my GI MD today and they told me to come in and get seen today." Pt presents 50 y/o female presenting to the ER c/o upper abd pain and constipation x 5 weeks. Pt states "I've been constipated for about 5 weeks now, I had a CT done 3 weeks ago which showed I am full of stool in my intestines, I've been taking miralax, enemas, suppositories and only a small amount of soft stool comes out,. I threw up 2x today and have this burning in my upper chest and what feels like a lump in my throat. I'm nervous that something serious is wrong, I talked to my GI MD today and they told me to come in and get seen today." Pt presents to [facility] w/ abdominal distension, nausea, 2 episodes of vomiting, c/o upper abdominal pain, upper abdominal tenderness upon palpation by MD, reports inability to pass gas, and decreased PO intake from abd distension. Pt denies chest pain, SOB/difficulty breathing, fever/chills, HA, lightheadedness/dizziness, numbness/tingling. Pt A&Ox3, heart sounds normal, breathing spontaneous and unlabored, lung sounds clear B/L, IV locked and patent, pt instructed on use of call bell, bed locked and in lowest position.

## 2021-02-03 ENCOUNTER — RESULT REVIEW (OUTPATIENT)
Age: 51
End: 2021-02-03

## 2021-02-03 LAB
ANION GAP SERPL CALC-SCNC: 12 MMOL/L — SIGNIFICANT CHANGE UP (ref 5–17)
BLD GP AB SCN SERPL QL: NEGATIVE — SIGNIFICANT CHANGE UP
BUN SERPL-MCNC: 17 MG/DL — SIGNIFICANT CHANGE UP (ref 7–23)
CALCIUM SERPL-MCNC: 9.3 MG/DL — SIGNIFICANT CHANGE UP (ref 8.4–10.5)
CANCER AG19-9 SERPL-ACNC: 37 U/ML — HIGH
CEA SERPL-MCNC: 2.7 NG/ML — SIGNIFICANT CHANGE UP (ref 0–3.8)
CHLORIDE SERPL-SCNC: 104 MMOL/L — SIGNIFICANT CHANGE UP (ref 96–108)
CO2 SERPL-SCNC: 24 MMOL/L — SIGNIFICANT CHANGE UP (ref 22–31)
CREAT SERPL-MCNC: 0.62 MG/DL — SIGNIFICANT CHANGE UP (ref 0.5–1.3)
GLUCOSE SERPL-MCNC: 109 MG/DL — HIGH (ref 70–99)
HCG SERPL-ACNC: <2 MIU/ML — SIGNIFICANT CHANGE UP
HCT VFR BLD CALC: 42.6 % — SIGNIFICANT CHANGE UP (ref 34.5–45)
HGB BLD-MCNC: 13.3 G/DL — SIGNIFICANT CHANGE UP (ref 11.5–15.5)
INR BLD: 1.11 RATIO — SIGNIFICANT CHANGE UP (ref 0.88–1.16)
MAGNESIUM SERPL-MCNC: 2.4 MG/DL — SIGNIFICANT CHANGE UP (ref 1.6–2.6)
MCHC RBC-ENTMCNC: 25 PG — LOW (ref 27–34)
MCHC RBC-ENTMCNC: 31.2 GM/DL — LOW (ref 32–36)
MCV RBC AUTO: 80.1 FL — SIGNIFICANT CHANGE UP (ref 80–100)
NRBC # BLD: 0 /100 WBCS — SIGNIFICANT CHANGE UP (ref 0–0)
PHOSPHATE SERPL-MCNC: 3.4 MG/DL — SIGNIFICANT CHANGE UP (ref 2.5–4.5)
PLATELET # BLD AUTO: 242 K/UL — SIGNIFICANT CHANGE UP (ref 150–400)
POTASSIUM SERPL-MCNC: 3.7 MMOL/L — SIGNIFICANT CHANGE UP (ref 3.5–5.3)
POTASSIUM SERPL-SCNC: 3.7 MMOL/L — SIGNIFICANT CHANGE UP (ref 3.5–5.3)
PROTHROM AB SERPL-ACNC: 13.3 SEC — SIGNIFICANT CHANGE UP (ref 10.6–13.6)
RBC # BLD: 5.32 M/UL — HIGH (ref 3.8–5.2)
RBC # FLD: 14.9 % — HIGH (ref 10.3–14.5)
RH IG SCN BLD-IMP: POSITIVE — SIGNIFICANT CHANGE UP
SARS-COV-2 IGG SERPL QL IA: NEGATIVE — SIGNIFICANT CHANGE UP
SARS-COV-2 IGM SERPL IA-ACNC: 0.47 INDEX — SIGNIFICANT CHANGE UP
SARS-COV-2 RNA SPEC QL NAA+PROBE: SIGNIFICANT CHANGE UP
SODIUM SERPL-SCNC: 140 MMOL/L — SIGNIFICANT CHANGE UP (ref 135–145)
WBC # BLD: 8.6 K/UL — SIGNIFICANT CHANGE UP (ref 3.8–10.5)
WBC # FLD AUTO: 8.6 K/UL — SIGNIFICANT CHANGE UP (ref 3.8–10.5)

## 2021-02-03 PROCEDURE — 45347 SIGMOIDOSCOPY W/PLCMT STENT: CPT | Mod: GC

## 2021-02-03 PROCEDURE — 88341 IMHCHEM/IMCYTCHM EA ADD ANTB: CPT | Mod: 26

## 2021-02-03 PROCEDURE — 71250 CT THORAX DX C-: CPT | Mod: 26

## 2021-02-03 PROCEDURE — 45331 SIGMOIDOSCOPY AND BIOPSY: CPT | Mod: GC,59

## 2021-02-03 PROCEDURE — 88305 TISSUE EXAM BY PATHOLOGIST: CPT | Mod: 26

## 2021-02-03 PROCEDURE — 76000 FLUOROSCOPY <1 HR PHYS/QHP: CPT | Mod: 26,GC,59

## 2021-02-03 PROCEDURE — 99253 IP/OBS CNSLTJ NEW/EST LOW 45: CPT

## 2021-02-03 PROCEDURE — 88342 IMHCHEM/IMCYTCHM 1ST ANTB: CPT | Mod: 26

## 2021-02-03 PROCEDURE — 71045 X-RAY EXAM CHEST 1 VIEW: CPT | Mod: 26,76

## 2021-02-03 PROCEDURE — 99253 IP/OBS CNSLTJ NEW/EST LOW 45: CPT | Mod: GC,25

## 2021-02-03 RX ORDER — ENOXAPARIN SODIUM 100 MG/ML
40 INJECTION SUBCUTANEOUS DAILY
Refills: 0 | Status: DISCONTINUED | OUTPATIENT
Start: 2021-02-03 | End: 2021-02-06

## 2021-02-03 RX ORDER — PANTOPRAZOLE SODIUM 20 MG/1
40 TABLET, DELAYED RELEASE ORAL DAILY
Refills: 0 | Status: DISCONTINUED | OUTPATIENT
Start: 2021-02-03 | End: 2021-02-06

## 2021-02-03 RX ORDER — KETOROLAC TROMETHAMINE 30 MG/ML
15 SYRINGE (ML) INJECTION ONCE
Refills: 0 | Status: DISCONTINUED | OUTPATIENT
Start: 2021-02-03 | End: 2021-02-03

## 2021-02-03 RX ORDER — SODIUM CHLORIDE 9 MG/ML
1000 INJECTION, SOLUTION INTRAVENOUS
Refills: 0 | Status: DISCONTINUED | OUTPATIENT
Start: 2021-02-03 | End: 2021-02-04

## 2021-02-03 RX ORDER — ACETAMINOPHEN 500 MG
1000 TABLET ORAL ONCE
Refills: 0 | Status: COMPLETED | OUTPATIENT
Start: 2021-02-03 | End: 2021-02-03

## 2021-02-03 RX ORDER — SODIUM CHLORIDE 9 MG/ML
500 INJECTION INTRAMUSCULAR; INTRAVENOUS; SUBCUTANEOUS
Refills: 0 | Status: DISCONTINUED | OUTPATIENT
Start: 2021-02-03 | End: 2021-02-04

## 2021-02-03 RX ORDER — INFLUENZA VIRUS VACCINE 15; 15; 15; 15 UG/.5ML; UG/.5ML; UG/.5ML; UG/.5ML
0.5 SUSPENSION INTRAMUSCULAR ONCE
Refills: 0 | Status: COMPLETED | OUTPATIENT
Start: 2021-02-03 | End: 2021-02-03

## 2021-02-03 RX ORDER — SODIUM CHLORIDE 9 MG/ML
500 INJECTION INTRAMUSCULAR; INTRAVENOUS; SUBCUTANEOUS
Refills: 0 | Status: DISCONTINUED | OUTPATIENT
Start: 2021-02-03 | End: 2021-02-03

## 2021-02-03 RX ADMIN — SODIUM CHLORIDE 125 MILLILITER(S): 9 INJECTION, SOLUTION INTRAVENOUS at 15:34

## 2021-02-03 RX ADMIN — Medication 400 MILLIGRAM(S): at 15:33

## 2021-02-03 RX ADMIN — Medication 15 MILLIGRAM(S): at 04:21

## 2021-02-03 RX ADMIN — SODIUM CHLORIDE 125 MILLILITER(S): 9 INJECTION, SOLUTION INTRAVENOUS at 04:21

## 2021-02-03 RX ADMIN — PANTOPRAZOLE SODIUM 40 MILLIGRAM(S): 20 TABLET, DELAYED RELEASE ORAL at 15:34

## 2021-02-03 NOTE — ED ADULT NURSE REASSESSMENT NOTE - NS ED NURSE REASSESS COMMENT FT1
Pt A+Ox3, NG tube placed by surgery team. Medication administered, IVF infusing. Rectal tube placed by 2 ED RNs, pt tolerated well. Pt resting comfortably in stretcher, call bell in reach, aware of plan of care. Pt admitted to surgery for large bowel obstruction, pending admission bed.

## 2021-02-03 NOTE — PROGRESS NOTE ADULT - SUBJECTIVE AND OBJECTIVE BOX
Surgery Progress Note  Patient is a 51y old  Female who presents with a chief complaint of LBO (2021 23:40)      SUBJECTIVE: Patient seen and examined at bedside with surgical team, patient without complaints. NAEON. O/n Toradol 15mg x1 for headache associated with not taking HTN meds, no focal neuro deficits, no n/v.    Physical Exam  GENERAL: NAD  CHEST/LUNG: Unlabored respirations  HEART: Regular rate and rhythm  ABDOMEN: Softly distended, LUQ TTP. No rebound or guarding.   NEURO: A&Ox3    Vital Signs Last 24 Hrs  T(C): 37.2 (2021 01:37), Max: 37.5 (2021 19:43)  T(F): 98.9 (2021 01:37), Max: 99.5 (2021 19:43)  HR: 89 (2021 01:37) (89 - 102)  BP: 131/82 (2021 01:37) (125/84 - 146/88)  BP(mean): 107 (2021 22:17) (103 - 107)  RR: 18 (2021 01:37) (17 - 18)  SpO2: 95% (2021 01:37) (95% - 99%)    I&O's Detail  MEDICATIONS  (STANDING):  enoxaparin Injectable 40 milliGRAM(s) SubCutaneous daily  lactated ringers. 1000 milliLiter(s) (125 mL/Hr) IV Continuous <Continuous>    MEDICATIONS  (PRN):      LABS:                        14.3   8.16  )-----------( 259      ( 2021 18:40 )             46.4     02-02    139  |  104  |  16  ----------------------------<  113<H>  3.9   |  20<L>  |  0.59    Ca    9.6      2021 18:40  Mg     2.4     02-02    TPro  7.7  /  Alb  4.4  /  TBili  0.3  /  DBili  x   /  AST  18  /  ALT  26  /  AlkPhos  46  02-02      LIVER FUNCTIONS - ( 2021 18:40 )  Alb: 4.4 g/dL / Pro: 7.7 g/dL / ALK PHOS: 46 U/L / ALT: 26 U/L / AST: 18 U/L / GGT: x           Urinalysis Basic - ( 2021 18:40 )    Color: Light Yellow / Appearance: Clear / S.021 / pH: x  Gluc: x / Ketone: Trace  / Bili: Negative / Urobili: Negative   Blood: x / Protein: Negative / Nitrite: Negative   Leuk Esterase: Negative / RBC: 1 /hpf / WBC 2 /HPF   Sq Epi: x / Non Sq Epi: 2 /hpf / Bacteria: Negative

## 2021-02-03 NOTE — PRE PROCEDURE NOTE - PRE PROCEDURE EVALUATION
Attending Physician:     Dr. Mar                       Procedure:   Colonic Stent Placement    Indication for Procedure:  Large Bowel Obstruction  ________________________________________________________  PAST MEDICAL & SURGICAL HISTORY:  Osteoarthrosis lower back  Hypertension    H/O  section x3  History of cholecystectomy      ALLERGIES:  sulfa drugs (Anaphylaxis)    HOME MEDICATIONS:  losartan 25 mg oral tablet: 1 tab(s) orally once a day  metoprolol succinate 50 mg oral tablet, extended release: 1 tab(s) orally once a day    AICD/PPM: [ ] yes   [ X] no    PERTINENT LAB DATA:                        13.3   8.60  )-----------( 242      ( 2021 07:19 )             42.6     02-03    140  |  104  |  17  ----------------------------<  109<H>  3.7   |  24  |  0.62    Ca    9.3      2021 07:00  Phos  3.4     02-03  Mg     2.4     02-03    TPro  7.7  /  Alb  4.4  /  TBili  0.3  /  DBili  x   /  AST  18  /  ALT  26  /  AlkPhos  46  02-02            HCG Quantitative, Serum: <2.0 mIU/mL (21 @ 07:15)  HCG Quantitative, Serum: <2.0 mIU/mL (21 @ 18:40)      PHYSICAL EXAMINATION:    Height (cm): 162.6  Weight (kg): 89.8  BMI (kg/m2): 34  BSA (m2): 1.95T(C): 37.1  HR: 85  BP: 126/79  RR: 18  SpO2: 98%    Constitutional: NAD    Neck:  No JVD  Respiratory: CTAB/L  Cardiovascular: S1 and S2  Gastrointestinal: BS+, soft, NT/ND  Extremities: No peripheral edema  Neurological: A/O x 3, no focal deficits        COMMENTS:  The patient is a suitable candidate for the planned procedure.

## 2021-02-03 NOTE — CONSULT NOTE ADULT - ASSESSMENT
Impression:    50 y/o F with hx of HTN and constipation, presenting with nausea/vomiting and abdominal pain, found to have LBO on CT A/P; Advanced GI consulted for colonoscopy w/ colonic stent    # Large bowel obstruction: Concern for underlying malignancy, however, may be due to large polyp or benign mass.  # HTN    Recommendations:  - Plan for colonoscopy with stent placement today  - NPO / NGT w/ low intermittent suction  - Aspiration precautions  - F/U Surgical recommendations  - Rest of care per primary team    Sheng Wright MD  Gastroenterology and Hepatology Fellow  Please contact via Microsoft Teams    Please call GI ([phone]) or e-mail [email or web address] if there are any additional questions or concerns, during weekdays from 8 am to 5 pm.     Please call answering service for on-call GI fellow ([phone]) after 5pm and before 8am, and on weekends.

## 2021-02-03 NOTE — CONSULT NOTE ADULT - SUBJECTIVE AND OBJECTIVE BOX
Chief Complaint: Nausea/vomiting    HPI:    50 y/o F with hx of HTN and constipation, presenting with nausea/vomiting and abdominal pain, found to have LBO on CT A/P; Advanced GI consulted for colonoscopy w/ colonic stent.    The patient developed intermittent episodes of diffuse generalized abdominal discomfort approximately 2 months ago and noticed decreased appetite as well. She states that she has been constipated for "a long time." She has had small thin stools for the past several weeks. No bloody stools or black tarry stools.  She saw here primary gastroenterologist Dr. Hobson and was started on stool softeners and laxative. She underwent a CT A/P with showed a narrowing in the colon and she was planned for colonoscopy next week. She developed nausea/vomiting this past week and inability to tolerate PO intake, which prompted her to present to the ED. Her last bowel movement was early this morning.    She had a reportedly normal EGD and colonoscopy many years ago.    Allergies:  sulfa drugs (Anaphylaxis)    Home Medications:  losartan 25 mg oral tablet: 1 tab(s) orally once a day  metoprolol succinate 50 mg oral tablet, extended release: 1 tab(s) orally once a day    Hospital Medications:  enoxaparin Injectable 40 milliGRAM(s) SubCutaneous daily  lactated ringers. 1000 milliLiter(s) IV Continuous <Continuous>    Allergies:   sulfa drugs (Anaphylaxis)    PMHX/PSHX:  Osteoarthrosis    Hypertension    H/O  section    History of cholecystectomy    Family history:      Denies family history of colon cancer/polyps, stomach cancer/polyps, pancreatic cancer/masses, liver cancer/disease, ovarian cancer and endometrial cancer.    Social History:     Tob: Denies  EtOH: Denies  Illicit Drugs: Denies    ROS:     General:  No wt loss, fevers, chills, night sweats, fatigue  Eyes:  Good vision, no reported pain  ENT:  No sore throat, pain, runny nose, dysphagia  CV:  No pain, palpitations, hypo/hypertension  Pulm:  No dyspnea, cough, tachypnea, wheezing  GI:  + pain, + nausea, + vomiting, No diarrhea, + constipation, No weight loss, No fever, No pruritis, No bright red blood per rectum, No tarry stools, No dysphagia,  :  No pain, bleeding, incontinence, nocturia  Muscle:  No pain, weakness  Neuro:  No weakness, tingling, memory problems  Psych:  No fatigue, insomnia, mood problems, depression  Endocrine:  No polyuria, polydipsia, cold/heat intolerance  Heme:  No petechiae, ecchymosis, easy bruisability  Skin:  No rash, tattoos, scars, edema    PHYSICAL EXAM:     Vital Signs:  Vital Signs Last 24 Hrs  T(C): 37.1 (2021 08:35), Max: 37.5 (2021 19:43)  T(F): 98.8 (2021 08:35), Max: 99.5 (2021 19:43)  HR: 85 (2021 08:35) (85 - 102)  BP: 126/79 (2021 08:35) (125/84 - 152/85)  BP(mean): 107 (2021 22:17) (103 - 107)  RR: 18 (2021 08:35) (17 - 18)  SpO2: 98% (2021 08:35) (94% - 99%)  Daily Height in cm: 162.6 (2021 08:35)      GENERAL:  No acute distress  HEENT:  Normocephalic/atraumatic, no scleral icterus, NGT in place  CHEST:  Normal effort, no accessory muscle use  HEART:  Regular rate and rhythm, no murmurs/rubs/gallops  ABDOMEN:  Soft, non-tender, + distended, normoactive bowel sounds  EXTREMITIES: No cyanosis, clubbing, or edema  SKIN:  No rash/erythema  NEURO:  Alert and oriented x 3    LABS:                        13.3   8.60  )-----------( 242      ( 2021 07:19 )             42.6     Mean Cell Volume: 80.1 fl (-21 @ 07:19)        140  |  104  |  17  ----------------------------<  109<H>  3.7   |  24  |  0.62    Ca    9.3      2021 07:00  Phos  3.4       Mg     2.4         TPro  7.7  /  Alb  4.4  /  TBili  0.3  /  DBili  x   /  AST  18  /  ALT  26  /  AlkPhos  46      LIVER FUNCTIONS - ( 2021 18:40 )  Alb: 4.4 g/dL / Pro: 7.7 g/dL / ALK PHOS: 46 U/L / ALT: 26 U/L / AST: 18 U/L / GGT: x             Urinalysis Basic - ( 2021 18:40 )    Color: Light Yellow / Appearance: Clear / S.021 / pH: x  Gluc: x / Ketone: Trace  / Bili: Negative / Urobili: Negative   Blood: x / Protein: Negative / Nitrite: Negative   Leuk Esterase: Negative / RBC: 1 /hpf / WBC 2 /HPF   Sq Epi: x / Non Sq Epi: 2 /hpf / Bacteria: Negative               13.3   8.60  )-----------( 242      ( 2021 07:19 )             42.6                         14.3   8.16  )-----------( 259      ( 2021 18:40 )             46.4     Imaging:    < from: CT Chest No Cont (21 @ 01:44) >    EXAM:  CT CHEST                            PROCEDURE DATE:  2021      INTERPRETATION:  CT CHEST WITHOUT CONTRAST    INDICATION: History of colon cancer. Evaluate for metastatic disease.    TECHNIQUE: Unenhanced helical images were obtained of the chest. Coronal and sagittal images were reconstructed. Maximum intensity projection images were generated.    COMPARISON: Radiograph chest 2/3/2021. CT abdomen and pelvis 2021.    FINDINGS:    Tubes/Lines: Enteric tube in the stomach.    Lungs And Airways: Along the left major fissure R 2 mm and 3 mm elliptical shaped opacities (series 3 image 55). The remainder of the lungs are clear. The airways are unremarkable.    Pleura: No pneumothorax.  No pleural effusion.    Mediastinum: The chest lymph nodes measure less than 10 mm in the short axis. The visualized portion of the thyroid gland is unremarkable. The esophagus is unremarkable.    Heart and Vasculature: The heart is normal in size.  There is no pericardial effusion. Left-sided aortic arch and left-sided descending thoracic aorta. The aorta is normal in caliber. Atheromatous disease of the aorta.    Upper Abdomen: Contrast in the renal collecting systems. Cholecystectomy.    Bones And Soft Tissues: The bones are unremarkable.  The soft tissues are unremarkable.      IMPRESSION:    1.  Elliptical shaped opacities along the left major fissure could represent pulmonary lymph nodes. The lungs are otherwise clear.      PAULINE CONWAY MD; Attending Radiologist  This document has been electronically signed. Feb  3 2021  8:40AM    < end of copied text >

## 2021-02-03 NOTE — PROGRESS NOTE ADULT - ASSESSMENT
51F with PMHx of HTN, and chronic constipation presents with 1 day of worsening abd pain associated with one episode of emesis in the setting of recent GI workup. CT w/ evidence of LBO with concurrent SBO and hepatic lesion c/f malignancy. Pt HD stable w/o evidence of peritonitis.     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 Spoke w/ GI fellow Dr. Nam - reviewed imaging, no plan for emergent intervention. Will see pt in AM.   - NPO/IVF/NGT   - F/u CEA, CA 19-9   - F/u CT chest   - Serial abdominal exams   - Examine before pain meds   - F/u AM labs    Red Surgery  [id number]

## 2021-02-03 NOTE — CHART NOTE - NSCHARTNOTEFT_GEN_A_CORE
S: Patient is feeling better after her C scope and Colon stent was placed. She has had a large loose BM. Pain is improving. Not yet passing flatus. She is belching less.    O:  Gen: NAD, sitting upright in bed  Chest: rrr, nonlabored respirations  Abdomen: s/nt/nd, NGT with minimal output    Vital Signs Last 24 Hrs  T(C): 36.7 (2021 16:49), Max: 37.5 (2021 19:43)  T(F): 98.1 (2021 16:49), Max: 99.5 (2021 19:43)  HR: 75 (2021 16:49) (74 - 102)  BP: 119/76 (2021 16:49) (119/76 - 152/85)  BP(mean): 107 (2021 22:17) (103 - 107)  RR: 16 (2021 16:49) (16 - 21)  SpO2: 97% (2021 16:49) (94% - 100%)    I&O's Detail    2021 07:01  -  2021 07:00  --------------------------------------------------------  IN:    Lactated Ringers: 125 mL  Total IN: 125 mL    OUT:    Nasogastric/Oral tube (mL): 250 mL    Rectal Tube (mL): 0 mL    Voided (mL): 0 mL  Total OUT: 250 mL    Total NET: -125 mL      2021 07:01  -  2021 17:17  --------------------------------------------------------  IN:    IV PiggyBack: 100 mL    Lactated Ringers: 500 mL  Total IN: 600 mL    OUT:    Nasogastric/Oral tube (mL): 250 mL    Voided (mL): 250 mL  Total OUT: 500 mL    Total NET: 100 mL      MEDICATIONS  (STANDING):  enoxaparin Injectable 40 milliGRAM(s) SubCutaneous daily  lactated ringers. 1000 milliLiter(s) (125 mL/Hr) IV Continuous <Continuous>  pantoprazole    Tablet 40 milliGRAM(s) Oral daily  sodium chloride 0.9%. 500 milliLiter(s) (30 mL/Hr) IV Continuous <Continuous>    MEDICATIONS  (PRN):      LABS:                        13.3   8.60  )-----------( 242      ( 2021 07:19 )             42.6     02-03    140  |  104  |  17  ----------------------------<  109<H>  3.7   |  24  |  0.62    Ca    9.3      2021 07:00  Phos  3.4     02-03  Mg     2.4     02-    TPro  7.7  /  Alb  4.4  /  TBili  0.3  /  DBili  x   /  AST  18  /  ALT  26  /  AlkPhos  46  02-02    PT/INR - ( 2021 16:56 )   PT: 13.3 sec;   INR: 1.11 ratio           LIVER FUNCTIONS - ( 2021 18:40 )  Alb: 4.4 g/dL / Pro: 7.7 g/dL / ALK PHOS: 46 U/L / ALT: 26 U/L / AST: 18 U/L / GGT: x           Urinalysis Basic - ( 2021 18:40 )    Color: Light Yellow / Appearance: Clear / S.021 / pH: x  Gluc: x / Ketone: Trace  / Bili: Negative / Urobili: Negative   Blood: x / Protein: Negative / Nitrite: Negative   Leuk Esterase: Negative / RBC: 1 /hpf / WBC 2 /HPF   Sq Epi: x / Non Sq Epi: 2 /hpf / Bacteria: Negative          A/P: 51 F with LBO and incompetent ileocecal valve, secondary SBO, and hepatic mass; now s/p colonoscopy with GI and placement of colon stent. Improving.    - examine before pain meds  - remove NGT  - NPO  - IVF  - will discuss plan for definitive mgmt with patient    Red Surgery [id number]

## 2021-02-04 LAB
ANION GAP SERPL CALC-SCNC: 10 MMOL/L — SIGNIFICANT CHANGE UP (ref 5–17)
BLD GP AB SCN SERPL QL: NEGATIVE — SIGNIFICANT CHANGE UP
BUN SERPL-MCNC: 12 MG/DL — SIGNIFICANT CHANGE UP (ref 7–23)
CALCIUM SERPL-MCNC: 9 MG/DL — SIGNIFICANT CHANGE UP (ref 8.4–10.5)
CHLORIDE SERPL-SCNC: 105 MMOL/L — SIGNIFICANT CHANGE UP (ref 96–108)
CO2 SERPL-SCNC: 24 MMOL/L — SIGNIFICANT CHANGE UP (ref 22–31)
CREAT SERPL-MCNC: 0.55 MG/DL — SIGNIFICANT CHANGE UP (ref 0.5–1.3)
GLUCOSE SERPL-MCNC: 85 MG/DL — SIGNIFICANT CHANGE UP (ref 70–99)
HCT VFR BLD CALC: 37.5 % — SIGNIFICANT CHANGE UP (ref 34.5–45)
HGB BLD-MCNC: 11.7 G/DL — SIGNIFICANT CHANGE UP (ref 11.5–15.5)
MAGNESIUM SERPL-MCNC: 2.2 MG/DL — SIGNIFICANT CHANGE UP (ref 1.6–2.6)
MCHC RBC-ENTMCNC: 25.4 PG — LOW (ref 27–34)
MCHC RBC-ENTMCNC: 31.2 GM/DL — LOW (ref 32–36)
MCV RBC AUTO: 81.3 FL — SIGNIFICANT CHANGE UP (ref 80–100)
NRBC # BLD: 0 /100 WBCS — SIGNIFICANT CHANGE UP (ref 0–0)
PHOSPHATE SERPL-MCNC: 2.7 MG/DL — SIGNIFICANT CHANGE UP (ref 2.5–4.5)
PLATELET # BLD AUTO: 186 K/UL — SIGNIFICANT CHANGE UP (ref 150–400)
POTASSIUM SERPL-MCNC: 3.7 MMOL/L — SIGNIFICANT CHANGE UP (ref 3.5–5.3)
POTASSIUM SERPL-SCNC: 3.7 MMOL/L — SIGNIFICANT CHANGE UP (ref 3.5–5.3)
RBC # BLD: 4.61 M/UL — SIGNIFICANT CHANGE UP (ref 3.8–5.2)
RBC # FLD: 15.1 % — HIGH (ref 10.3–14.5)
RH IG SCN BLD-IMP: POSITIVE — SIGNIFICANT CHANGE UP
SODIUM SERPL-SCNC: 139 MMOL/L — SIGNIFICANT CHANGE UP (ref 135–145)
WBC # BLD: 6.47 K/UL — SIGNIFICANT CHANGE UP (ref 3.8–10.5)
WBC # FLD AUTO: 6.47 K/UL — SIGNIFICANT CHANGE UP (ref 3.8–10.5)

## 2021-02-04 PROCEDURE — 74183 MRI ABD W/O CNTR FLWD CNTR: CPT | Mod: 26

## 2021-02-04 PROCEDURE — 99231 SBSQ HOSP IP/OBS SF/LOW 25: CPT

## 2021-02-04 PROCEDURE — 99232 SBSQ HOSP IP/OBS MODERATE 35: CPT | Mod: GC

## 2021-02-04 RX ORDER — ACETAMINOPHEN 500 MG
1000 TABLET ORAL ONCE
Refills: 0 | Status: COMPLETED | OUTPATIENT
Start: 2021-02-04 | End: 2021-02-04

## 2021-02-04 RX ORDER — SODIUM,POTASSIUM PHOSPHATES 278-250MG
1 POWDER IN PACKET (EA) ORAL ONCE
Refills: 0 | Status: COMPLETED | OUTPATIENT
Start: 2021-02-04 | End: 2021-02-04

## 2021-02-04 RX ORDER — POTASSIUM CHLORIDE 20 MEQ
40 PACKET (EA) ORAL ONCE
Refills: 0 | Status: COMPLETED | OUTPATIENT
Start: 2021-02-04 | End: 2021-02-04

## 2021-02-04 RX ORDER — DEXTROSE MONOHYDRATE, SODIUM CHLORIDE, AND POTASSIUM CHLORIDE 50; .745; 4.5 G/1000ML; G/1000ML; G/1000ML
1000 INJECTION, SOLUTION INTRAVENOUS
Refills: 0 | Status: DISCONTINUED | OUTPATIENT
Start: 2021-02-04 | End: 2021-02-05

## 2021-02-04 RX ADMIN — DEXTROSE MONOHYDRATE, SODIUM CHLORIDE, AND POTASSIUM CHLORIDE 125 MILLILITER(S): 50; .745; 4.5 INJECTION, SOLUTION INTRAVENOUS at 06:27

## 2021-02-04 RX ADMIN — PANTOPRAZOLE SODIUM 40 MILLIGRAM(S): 20 TABLET, DELAYED RELEASE ORAL at 13:05

## 2021-02-04 RX ADMIN — Medication 400 MILLIGRAM(S): at 03:44

## 2021-02-04 RX ADMIN — Medication 2 MILLIGRAM(S): at 13:53

## 2021-02-04 RX ADMIN — Medication 40 MILLIEQUIVALENT(S): at 09:37

## 2021-02-04 RX ADMIN — ENOXAPARIN SODIUM 40 MILLIGRAM(S): 100 INJECTION SUBCUTANEOUS at 13:05

## 2021-02-04 RX ADMIN — DEXTROSE MONOHYDRATE, SODIUM CHLORIDE, AND POTASSIUM CHLORIDE 65 MILLILITER(S): 50; .745; 4.5 INJECTION, SOLUTION INTRAVENOUS at 09:37

## 2021-02-04 RX ADMIN — Medication 1 PACKET(S): at 09:36

## 2021-02-04 NOTE — PROGRESS NOTE ADULT - ASSESSMENT
Impression:    50 y/o F with hx of HTN and constipation, presenting with nausea/vomiting and abdominal pain, found to have LBO on CT A/P; Advanced GI consulted for colonoscopy w/ colonic stent    # Large bowel obstruction s/p stent placement: Colonoscopy on 2/3/21 with placement of colonic stent. Visualization of infiltrative-appearing, circumferential stricture, concerning for malignancy. Biopsies obtained.  # HTN    Recommendations:  - Clear liquid diet  - F/U pathology  - F/U Surgical recommendations  - Rest of care per primary team    Sheng Wright MD  Gastroenterology and Hepatology Fellow  Please contact via Microsoft Teams    Please call GI ([phone]) or e-mail [email or web address] if there are any additional questions or concerns, during weekdays from 8 am to 5 pm.     Please call answering service for on-call GI fellow ([phone]) after 5pm and before 8am, and on weekends. Impression:    52 y/o F with hx of HTN and constipation, presenting with nausea/vomiting and abdominal pain, found to have LBO on CT A/P; Advanced GI consulted for colonoscopy w/ colonic stent    # Large bowel obstruction s/p stent placement: Colonoscopy on 2/3/21 with placement of colonic stent. Visualization of infiltrative-appearing, circumferential stricture, concerning for malignancy. Biopsies obtained.  # HTN    Recommendations:  - CT colonography to rule out synchronous lesions (call Radiology to schedule)  - Clear liquid diet  - F/U pathology  - F/U Surgical recommendations  - Rest of care per primary team    Sheng Wright MD  Gastroenterology and Hepatology Fellow  Please contact via Microsoft Teams    Please call GI ([phone]) or e-mail [email or web address] if there are any additional questions or concerns, during weekdays from 8 am to 5 pm.     Please call answering service for on-call GI fellow ([phone]) after 5pm and before 8am, and on weekends.

## 2021-02-04 NOTE — PROGRESS NOTE ADULT - SUBJECTIVE AND OBJECTIVE BOX
Surgery Progress Note  Patient is a 51y old  Female who presents with a chief complaint of Large bowel obstruction (2021 09:02)      SUBJECTIVE: Patient seen and examined at bedside with surgical team. FRED. IV Tylenol x 1 for back pain. S/p colon stent placement with GI yesterday.     Physical Exam  GENERAL: NAD  CHEST/LUNG: Unlabored respirations  HEART: Regular rate and rhythm  ABDOMEN: Softly distended, LUQ TTP. No rebound or guarding.   NEURO: A&Ox3    Vital Signs Last 24 Hrs  T(C): 37.1 (2021 05:09), Max: 37.4 (2021 06:16)  T(F): 98.7 (2021 05:09), Max: 99.3 (2021 06:16)  HR: 76 (2021 05:09) (72 - 94)  BP: 121/79 (2021 05:09) (118/80 - 152/85)  BP(mean): --  RR: 18 (2021 05:09) (16 - 21)  SpO2: 96% (2021 05:09) (96% - 100%)    I&O's Detail    2021 07:01  -  2021 07:00  --------------------------------------------------------  IN:    Lactated Ringers: 125 mL  Total IN: 125 mL    OUT:    Nasogastric/Oral tube (mL): 250 mL    Rectal Tube (mL): 0 mL    Voided (mL): 0 mL  Total OUT: 250 mL    Total NET: -125 mL      2021 07:01  -  2021 06:04  --------------------------------------------------------  IN:    IV PiggyBack: 200 mL    Lactated Ringers: 2250 mL  Total IN: 2450 mL    OUT:    Nasogastric/Oral tube (mL): 250 mL    Voided (mL): 250 mL  Total OUT: 500 mL    Total NET: 1950 mL      MEDICATIONS  (STANDING):  dextrose 5% + sodium chloride 0.45% with potassium chloride 20 mEq/L 1000 milliLiter(s) (125 mL/Hr) IV Continuous <Continuous>  enoxaparin Injectable 40 milliGRAM(s) SubCutaneous daily  influenza   Vaccine 0.5 milliLiter(s) IntraMuscular once  pantoprazole    Tablet 40 milliGRAM(s) Oral daily    MEDICATIONS  (PRN):      LABS:                        13.3   8.60  )-----------( 242      ( 2021 07:19 )             42.6     02-    140  |  104  |  17  ----------------------------<  109<H>  3.7   |  24  |  0.62    Ca    9.3      2021 07:00  Phos  3.4     02-03  Mg     2.4     -    TPro  7.7  /  Alb  4.4  /  TBili  0.3  /  DBili  x   /  AST  18  /  ALT  26  /  AlkPhos  46  02-02    PT/INR - ( 2021 16:56 )   PT: 13.3 sec;   INR: 1.11 ratio           LIVER FUNCTIONS - ( 2021 18:40 )  Alb: 4.4 g/dL / Pro: 7.7 g/dL / ALK PHOS: 46 U/L / ALT: 26 U/L / AST: 18 U/L / GGT: x           Urinalysis Basic - ( 2021 18:40 )    Color: Light Yellow / Appearance: Clear / S.021 / pH: x  Gluc: x / Ketone: Trace  / Bili: Negative / Urobili: Negative   Blood: x / Protein: Negative / Nitrite: Negative   Leuk Esterase: Negative / RBC: 1 /hpf / WBC 2 /HPF   Sq Epi: x / Non Sq Epi: 2 /hpf / Bacteria: Negative      ABO Interpretation: DAVID (21 @ 16:56)       Surgery Progress Note  Patient is a 51y old  Female who presents with a chief complaint of Large bowel obstruction (2021 09:02)      SUBJECTIVE: Patient seen and examined at bedside with surgical team. FRED. IV Tylenol x 1 for back pain. S/p colon stent placement with GI yesterday. Passing flatus, having BMs. Minimal abdominal pain. Voiding. Ambulating.     Physical Exam  GENERAL: NAD  CHEST/LUNG: Unlabored respirations  HEART: Regular rate and rhythm  ABDOMEN: Softly distended, nontender. No rebound or guarding.   NEURO: A&Ox3    Vital Signs Last 24 Hrs  T(C): 37.1 (2021 05:09), Max: 37.4 (2021 06:16)  T(F): 98.7 (2021 05:09), Max: 99.3 (2021 06:16)  HR: 76 (2021 05:09) (72 - 94)  BP: 121/79 (2021 05:09) (118/80 - 152/85)  BP(mean): --  RR: 18 (2021 05:09) (16 - 21)  SpO2: 96% (2021 05:09) (96% - 100%)    I&O's Detail    2021 07:01  -  2021 07:00  --------------------------------------------------------  IN:    Lactated Ringers: 125 mL  Total IN: 125 mL    OUT:    Nasogastric/Oral tube (mL): 250 mL    Rectal Tube (mL): 0 mL    Voided (mL): 0 mL  Total OUT: 250 mL    Total NET: -125 mL      2021 07:01  -  2021 06:04  --------------------------------------------------------  IN:    IV PiggyBack: 200 mL    Lactated Ringers: 2250 mL  Total IN: 2450 mL    OUT:    Nasogastric/Oral tube (mL): 250 mL    Voided (mL): 250 mL  Total OUT: 500 mL    Total NET: 1950 mL      MEDICATIONS  (STANDING):  dextrose 5% + sodium chloride 0.45% with potassium chloride 20 mEq/L 1000 milliLiter(s) (125 mL/Hr) IV Continuous <Continuous>  enoxaparin Injectable 40 milliGRAM(s) SubCutaneous daily  influenza   Vaccine 0.5 milliLiter(s) IntraMuscular once  pantoprazole    Tablet 40 milliGRAM(s) Oral daily    MEDICATIONS  (PRN):      LABS:                        13.3   8.60  )-----------( 242      ( 2021 07:19 )             42.6     -    140  |  104  |  17  ----------------------------<  109<H>  3.7   |  24  |  0.62    Ca    9.3      2021 07:00  Phos  3.4     02-  Mg     2.4         TPro  7.7  /  Alb  4.4  /  TBili  0.3  /  DBili  x   /  AST  18  /  ALT  26  /  AlkPhos  46  02-    PT/INR - ( 2021 16:56 )   PT: 13.3 sec;   INR: 1.11 ratio           LIVER FUNCTIONS - ( 2021 18:40 )  Alb: 4.4 g/dL / Pro: 7.7 g/dL / ALK PHOS: 46 U/L / ALT: 26 U/L / AST: 18 U/L / GGT: x           Urinalysis Basic - ( 2021 18:40 )    Color: Light Yellow / Appearance: Clear / S.021 / pH: x  Gluc: x / Ketone: Trace  / Bili: Negative / Urobili: Negative   Blood: x / Protein: Negative / Nitrite: Negative   Leuk Esterase: Negative / RBC: 1 /hpf / WBC 2 /HPF   Sq Epi: x / Non Sq Epi: 2 /hpf / Bacteria: Negative      ABO Interpretation: A (21 @ 16:56)

## 2021-02-04 NOTE — PROGRESS NOTE ADULT - SUBJECTIVE AND OBJECTIVE BOX
Chief Complaint: Nausea/vomiting  Reason for consult: LBO    Interval Events:   - Patient underwent colonoscopy with stent placement yesterday (Please see full report in Results section of [facility])  - Patient is having bowel movements and is tolerating clear liquids    Allergies:  sulfa drugs (Anaphylaxis)    Hospital Medications:  dextrose 5% + sodium chloride 0.45% with potassium chloride 20 mEq/L 1000 milliLiter(s) IV Continuous <Continuous>  enoxaparin Injectable 40 milliGRAM(s) SubCutaneous daily  influenza   Vaccine 0.5 milliLiter(s) IntraMuscular once  pantoprazole    Tablet 40 milliGRAM(s) Oral daily  potassium chloride    Tablet ER 40 milliEquivalent(s) Oral once  potassium phosphate / sodium phosphate Powder (PHOS-NaK) 1 Packet(s) Oral once    PMHX/PSHX:  Osteoarthrosis    Hypertension    H/O  section    History of cholecystecto   Family history:      ROS:     General:  No wt loss, fevers, chills, night sweats, fatigue,   Eyes:  Good vision, no reported pain  ENT:  No sore throat, pain, runny nose, dysphagia  CV:  No pain, palpitations, hypo/hypertension  Pulm:  No dyspnea, cough, tachypnea, wheezing  GI:  No pain, No nausea, No vomiting, No diarrhea, No constipation, No weight loss, No fever, No pruritis, No rectal bleeding, No tarry stools, No dysphagia  :  No pain, bleeding, incontinence, nocturia  Muscle:  No pain, weakness  Neuro:  No weakness, tingling, memory problems  Psych:  No fatigue, insomnia, mood problems, depression  Endocrine:  No polyuria, polydipsia, cold/heat intolerance  Heme:  No petechiae, ecchymosis, easy bruisability  Skin:  No rash, tattoos, scars, edema    PHYSICAL EXAM:   Vital Signs:  Vital Signs Last 24 Hrs  T(C): 37.1 (2021 05:09), Max: 37.1 (2021 00:43)  T(F): 98.7 (2021 05:09), Max: 98.8 (2021 00:43)  HR: 76 (2021 05:09) (72 - 89)  BP: 121/79 (2021 05:09) (118/80 - 137/69)  BP(mean): --  RR: 18 (2021 05:09) (16 - 21)  SpO2: 96% (2021 05:09) (96% - 100%)  Daily Height in cm: 162.56 (2021 12:03)      GENERAL:  No acute distress  HEENT:  Normocephalic/atraumatic,  no scleral icterus  CHEST:  Normal effort, no accessory muscle use  ABDOMEN:  Soft, non-tender, + distended (improved)  EXTREMITIES:  No cyanosis, clubbing, or edema  SKIN:  No rash/erythema  NEURO:  Alert and oriented x 3    LABS:                        11.7   6.47  )-----------( 186      ( 2021 07:31 )             37.5     Mean Cell Volume: 81.3 fl (21 @ 07:31)    02-    139  |  105  |  12  ----------------------------<  85  3.7   |  24  |  0.55    Ca    9.0      2021 07:31  Phos  2.7     02-  Mg     2.2     02-04    TPro  7.7  /  Alb  4.4  /  TBili  0.3  /  DBili  x   /  AST  18  /  ALT  26  /  AlkPhos  46  02-02    LIVER FUNCTIONS - ( 2021 18:40 )  Alb: 4.4 g/dL / Pro: 7.7 g/dL / ALK PHOS: 46 U/L / ALT: 26 U/L / AST: 18 U/L / GGT: x           PT/INR - ( 2021 16:56 )   PT: 13.3 sec;   INR: 1.11 ratio           Urinalysis Basic - ( 2021 18:40 )    Color: Light Yellow / Appearance: Clear / S.021 / pH: x  Gluc: x / Ketone: Trace  / Bili: Negative / Urobili: Negative   Blood: x / Protein: Negative / Nitrite: Negative   Leuk Esterase: Negative / RBC: 1 /hpf / WBC 2 /HPF   Sq Epi: x / Non Sq Epi: 2 /hpf / Bacteria: Negative               11.7   6.47  )-----------( 186      ( 2021 07:31 )             37.5                         13.3   8.60  )-----------( 242      ( 2021 07:19 )             42.6                         14.3   8.16  )-----------( 259      ( 2021 18:40 )             46.4     Imaging:    Reviewed    Procedures:    < from: Colonoscopy (21 @ 10:54) >    [facility]  ____________________________________________________________________________________________________  Patient Name: Karen Rush                      MRN: [id number]  Account Number: [id number]                     YOB: 1970  Room: Endoscopy Room 2                           Gender: Female  Attending MD: Frantz Mar MD                    Procedure Date No Time: 2/3/2021  ____________________________________________________________________________________________________     Procedure:           Colonoscopy  Indications:         Abnormal CT of the GI tract; concern for large bowel obstruction  Providers:           Frantz Mar MD, Ginna Lott (Fellow)  Referring MD:        Inpatient  Medicines:          Monitored Anesthesia Care  Complications:       No immediate complications.  ____________________________________________________________________________________________________  Procedure:           Pre-Anesthesia Assessment:          - Prior to the procedure, a History and Physical was performed, and patient                        medications and allergies were reviewed. The patient is competent. The risks                        and benefits of the procedure and the sedation options and risks were                        discussed with the patient. All questions were answered and informed consent                        was obtained. Patient identification and proposed procedure were verified by                        the physician, the nurse and the anesthetist in the endoscopy suite.                        Prophylactic Antibiotics: The patient does not require prophylactic                        antibiotics. Prior Anticoagulants: The patient has taken no previous                        anticoagulant or antiplatelet agents. ASA Grade Assessment: III - A patient                        with severe systemic disease. After reviewing the risks and benefits, the                        patient was deemed in satisfactory condition to undergo the procedure. The                        anesthesia plan was to use monitored anesthesia care (MAC). Immediately prior                        to administration of medications, the patient was re-assessed for adequacy to                   receive sedatives. The heart rate, respiratory rate, oxygen saturations,                        blood pressure, adequacy of pulmonary ventilation, and response to care were                        monitored throughout the procedure. The physical status of the patient was                        re-assessed after the procedure.                       After I obtained informed consent, the scope was passed under direct vision.                        Throughout the procedure, the patient's blood pressure, pulse, and oxygen                        saturations were monitored continuously. The Colonoscope was introduced                        through the anus with the intention of advancing to the cecum. The scope was       advanced to the descending colon. The colonoscopy was performed without                        difficulty. The patient tolerated the procedure well. The quality of the                        bowel preparation was poor.                                                                             Findings:       There was an infiltrative-appearing, mildly friable, intrinsic, circumferential, severe        stricture at approximately 35cm from the anal verge. The scope was not able to traverse the        stricture. The areas was biopsied for histology. A long biliary extraction balloon over a        long guidewire was used to traverse the stricture. Contrast was injected through the balloon        and a short stricture was identified. The balloon was then exchanged for a wire guided        colonic stent and a 25 mm x 90 mm uncovered self expanding metal stent was deployed across        the stricture. It appeared to be in adequate position on endoscopy and fluoroscopy. Stool was        seen to flow through the stent.       Distal to the stent (approximately 3 cm distally) there was an area of erythematous, inflamed        mucosa, possibly consistent with ischemic colitis.       The rest of the examined sigmoidcolon and rectum appeared normal, however, a thick film of        stool obscured close visualization of the mucosa.                                                                                                        Impression:          - Intrinsic, circumferential, severe stricture at approximately 35cm from the                        anal verge. Biopsied and stented with 25 x 90mm uncovered metal stent. Please                        see PACS for fluoroscopic images.                       - Erythematous area 3 cm distal to the mass, possibly ischemic.                       - Suboptimal preparation (enemas only as pt had large bowel obstruction)  Recommendation:      - [facility].  - Follow up pathology.                       - Ok to remove NGT from GI standpoint.                       - Would initiate bowel prep (Golytely) tomorrow if patient feeling well.                       - Surgical planning for definitive treatment per colorectal surgery.                       - Discussed with Dr. Luis Enrique Segovia (colorectal surgeon).                       - Followup in GI office (prior GI Dr. Hamzah Lala).                             Attending Participation:       I was present and participated during the entire procedure, including non-key portions.                                                                                                          ______________  Frantz Mar MD  2/3/2021 6:09:51 PM  Number of Addenda: 0    Note Initiated On: 2/3/2021 10:54 AM    < end of copied text >

## 2021-02-04 NOTE — PROGRESS NOTE ADULT - ASSESSMENT
51F with PMHx of HTN, and chronic constipation presents with 1 day of worsening abd pain associated with one episode of emesis in the setting of recent GI workup. CT w/ evidence of LBO with concurrent SBO and hepatic lesion c/f malignancy. Pt HD stable w/o evidence of peritonitis.     Plan:  - Appreciate GI recs: remove NGT, Golytely, f/u pathology  - NPO/IVF  - Serial abdominal exams   - Examine before pain meds   - F/u AM labs    Red Surgery  [id number]  51F with PMHx of HTN, and chronic constipation presents with 1 day of worsening abd pain associated with one episode of emesis in the setting of recent GI workup. CT w/ evidence of LBO with concurrent SBO and hepatic lesion c/f malignancy s/p stent placement with GI, tolerated well.     Plan:  - Advance to [facility]  - F/u GI plans for possible future colonoscopy  - Operative planning  - F/u pathology  - Appreciate GI recs: Golytely  - Serial abdominal exams   - DVT ppx  - PPI  - F/u AM labs    Red Surgery  [id number]

## 2021-02-04 NOTE — PROGRESS NOTE ADULT - ATTENDING COMMENTS
LBO s/p stent  -F/U MRI to evaluate liver lesion  -virtual colonoscopy  -will determine OR planning after all imaging reviewed  -f/u pathology  -dvt ppx  -cea

## 2021-02-04 NOTE — PROGRESS NOTE ADULT - ATTENDING COMMENTS
As above  Having BMs, belly soft  Would check virtual colonoscopy (CT colonography) to eval for right sided/synchronous lesions as would try to avoid re-scoping given possibility of dislodging stent  Bowel prep  Surgical plans per colorectal team    Thank you for this interesting consult.  Please call the advanced GI service with any questions or concerns. As above  Having BMs, belly soft  Follow up pathology  Would check virtual colonoscopy (CT colonography) to eval for right sided/synchronous lesions as would try to avoid re-scoping given possibility of dislodging stent  Bowel prep  Surgical plans per colorectal team    Thank you for this interesting consult.  Please call the advanced GI service with any questions or concerns.

## 2021-02-04 NOTE — CHART NOTE - NSCHARTNOTEFT_GEN_A_CORE
Spoke with radiology- currently does not feel comfortable with starting prep for CT colonography based on history of colonoscopy yesterday with biopsy and based on history of diarrhea. Called GI to get specifics with regards to colonoscopy, stenting and biopsy.     [facility], [id number] Spoke with radiology- currently does not feel comfortable with starting prep for CT colonography based on history of colonoscopy yesterday with biopsy and based on history of diarrhea. Called GI to get specifics with regards to colonoscopy, stenting and biopsy.     Discussed with GI overnight, and they will contact radiology to discuss imaging options and what was completed during the colonoscopy.     [facility], [id number]

## 2021-02-05 ENCOUNTER — TRANSCRIPTION ENCOUNTER (OUTPATIENT)
Age: 51
End: 2021-02-05

## 2021-02-05 PROBLEM — I10 ESSENTIAL (PRIMARY) HYPERTENSION: Chronic | Status: ACTIVE | Noted: 2021-02-02

## 2021-02-05 PROBLEM — M19.90 UNSPECIFIED OSTEOARTHRITIS, UNSPECIFIED SITE: Chronic | Status: ACTIVE | Noted: 2021-02-02

## 2021-02-05 LAB
ANION GAP SERPL CALC-SCNC: 12 MMOL/L — SIGNIFICANT CHANGE UP (ref 5–17)
ANION GAP SERPL CALC-SCNC: 9 MMOL/L — SIGNIFICANT CHANGE UP (ref 5–17)
BUN SERPL-MCNC: 6 MG/DL — LOW (ref 7–23)
BUN SERPL-MCNC: 6 MG/DL — LOW (ref 7–23)
CALCIUM SERPL-MCNC: 9.1 MG/DL — SIGNIFICANT CHANGE UP (ref 8.4–10.5)
CALCIUM SERPL-MCNC: 9.5 MG/DL — SIGNIFICANT CHANGE UP (ref 8.4–10.5)
CHLORIDE SERPL-SCNC: 103 MMOL/L — SIGNIFICANT CHANGE UP (ref 96–108)
CHLORIDE SERPL-SCNC: 105 MMOL/L — SIGNIFICANT CHANGE UP (ref 96–108)
CO2 SERPL-SCNC: 23 MMOL/L — SIGNIFICANT CHANGE UP (ref 22–31)
CO2 SERPL-SCNC: 24 MMOL/L — SIGNIFICANT CHANGE UP (ref 22–31)
CREAT SERPL-MCNC: 0.51 MG/DL — SIGNIFICANT CHANGE UP (ref 0.5–1.3)
CREAT SERPL-MCNC: 0.56 MG/DL — SIGNIFICANT CHANGE UP (ref 0.5–1.3)
GLUCOSE SERPL-MCNC: 104 MG/DL — HIGH (ref 70–99)
GLUCOSE SERPL-MCNC: 98 MG/DL — SIGNIFICANT CHANGE UP (ref 70–99)
HCT VFR BLD CALC: 40.5 % — SIGNIFICANT CHANGE UP (ref 34.5–45)
HGB BLD-MCNC: 12.8 G/DL — SIGNIFICANT CHANGE UP (ref 11.5–15.5)
MAGNESIUM SERPL-MCNC: 2.2 MG/DL — SIGNIFICANT CHANGE UP (ref 1.6–2.6)
MCHC RBC-ENTMCNC: 25.4 PG — LOW (ref 27–34)
MCHC RBC-ENTMCNC: 31.6 GM/DL — LOW (ref 32–36)
MCV RBC AUTO: 80.5 FL — SIGNIFICANT CHANGE UP (ref 80–100)
NRBC # BLD: 0 /100 WBCS — SIGNIFICANT CHANGE UP (ref 0–0)
PHOSPHATE SERPL-MCNC: 3.1 MG/DL — SIGNIFICANT CHANGE UP (ref 2.5–4.5)
PLATELET # BLD AUTO: 197 K/UL — SIGNIFICANT CHANGE UP (ref 150–400)
POTASSIUM SERPL-MCNC: 3.5 MMOL/L — SIGNIFICANT CHANGE UP (ref 3.5–5.3)
POTASSIUM SERPL-MCNC: 6.2 MMOL/L — CRITICAL HIGH (ref 3.5–5.3)
POTASSIUM SERPL-SCNC: 3.5 MMOL/L — SIGNIFICANT CHANGE UP (ref 3.5–5.3)
POTASSIUM SERPL-SCNC: 6.2 MMOL/L — CRITICAL HIGH (ref 3.5–5.3)
RBC # BLD: 5.03 M/UL — SIGNIFICANT CHANGE UP (ref 3.8–5.2)
RBC # FLD: 15 % — HIGH (ref 10.3–14.5)
SODIUM SERPL-SCNC: 137 MMOL/L — SIGNIFICANT CHANGE UP (ref 135–145)
SODIUM SERPL-SCNC: 139 MMOL/L — SIGNIFICANT CHANGE UP (ref 135–145)
WBC # BLD: 7.32 K/UL — SIGNIFICANT CHANGE UP (ref 3.8–10.5)
WBC # FLD AUTO: 7.32 K/UL — SIGNIFICANT CHANGE UP (ref 3.8–10.5)

## 2021-02-05 PROCEDURE — 99232 SBSQ HOSP IP/OBS MODERATE 35: CPT | Mod: GC

## 2021-02-05 PROCEDURE — 99231 SBSQ HOSP IP/OBS SF/LOW 25: CPT

## 2021-02-05 PROCEDURE — 99233 SBSQ HOSP IP/OBS HIGH 50: CPT

## 2021-02-05 RX ORDER — LISINOPRIL 2.5 MG/1
20 TABLET ORAL DAILY
Refills: 0 | Status: DISCONTINUED | OUTPATIENT
Start: 2021-02-05 | End: 2021-02-06

## 2021-02-05 RX ORDER — POLYETHYLENE GLYCOL 3350 17 G/17G
17 POWDER, FOR SOLUTION ORAL
Qty: 0 | Refills: 0 | DISCHARGE
Start: 2021-02-05

## 2021-02-05 RX ORDER — POLYETHYLENE GLYCOL 3350 17 G/17G
17 POWDER, FOR SOLUTION ORAL DAILY
Refills: 0 | Status: DISCONTINUED | OUTPATIENT
Start: 2021-02-05 | End: 2021-02-06

## 2021-02-05 RX ORDER — LOSARTAN POTASSIUM 100 MG/1
25 TABLET, FILM COATED ORAL DAILY
Refills: 0 | Status: DISCONTINUED | OUTPATIENT
Start: 2021-02-05 | End: 2021-02-05

## 2021-02-05 RX ORDER — METOPROLOL TARTRATE 50 MG
25 TABLET ORAL
Refills: 0 | Status: DISCONTINUED | OUTPATIENT
Start: 2021-02-05 | End: 2021-02-06

## 2021-02-05 RX ADMIN — PANTOPRAZOLE SODIUM 40 MILLIGRAM(S): 20 TABLET, DELAYED RELEASE ORAL at 09:24

## 2021-02-05 RX ADMIN — DEXTROSE MONOHYDRATE, SODIUM CHLORIDE, AND POTASSIUM CHLORIDE 125 MILLILITER(S): 50; .745; 4.5 INJECTION, SOLUTION INTRAVENOUS at 06:38

## 2021-02-05 RX ADMIN — LISINOPRIL 20 MILLIGRAM(S): 2.5 TABLET ORAL at 14:10

## 2021-02-05 RX ADMIN — ENOXAPARIN SODIUM 40 MILLIGRAM(S): 100 INJECTION SUBCUTANEOUS at 13:12

## 2021-02-05 RX ADMIN — Medication 25 MILLIGRAM(S): at 17:46

## 2021-02-05 NOTE — DIETITIAN INITIAL EVALUATION ADULT. - OTHER INFO
Nutrition Supplements PTA: vitamin D    Pt UBW: ~198 lbs as reflected by dosing wt. Pt admits to ~20 lb wt gain within the past year secondary to "menopause" and COVID-19 pandemic resulting in decreased physical activity.    Pt NPO primarily x 4 days since admission, diet just advanced this afternoon. Pt has yet to receive first meal.  Pt has no c/o nausea, vomiting, or constipation. She endorses ongoing diarrhea, which she was told was normal secondary to LBO on admission and constipation at baseline. No bloody BMs reported.    Reviewed low-fiber nutrition therapy and provided educational handout. Discussed importance of avoiding: fiber rich foods, fresh fruits/vegetables, whole grains, added fiber in processed foods. Discussed chewing foods well and adequate hydration. Discussed diet advancement to regular diet, informed that diet typically progresses to include fiber after outpatient apt- discussed slow/steady reintroduction of fiber pending MD recommendation at that time. Discussed healthy meal pattern to promote wt loss towards IBW. Pt verbalized understanding.

## 2021-02-05 NOTE — DISCHARGE NOTE PROVIDER - NSDCFUADDINST_GEN_ALL_CORE_FT
Schedule appointment for next Tuesday, 2/9, with Dr. Segovia.    Schedule follow-up appointment with your gastroenterologist, Dr. Hobson in 1-2 weeks.    Follow a low residue (fiber) diet as outlined in your discharge paperwork.     Continue taking your home medications as instructed by your primary care provider.     Continue taking Miralax once a day, which is available over the counter.

## 2021-02-05 NOTE — DISCHARGE NOTE PROVIDER - NSDCCPCAREPLAN_GEN_ALL_CORE_FT
PRINCIPAL DISCHARGE DIAGNOSIS  Diagnosis: Large bowel obstruction  Assessment and Plan of Treatment: Follow-up outpatient with Dr. Segovia for future operative planning and gastroenterologist Dr. Hobson for stent management

## 2021-02-05 NOTE — PROGRESS NOTE ADULT - SUBJECTIVE AND OBJECTIVE BOX
Subjective: No new complaints. Abdominal discomfort resolving. +/+. No N/V. No CP/SOB    Objective:  Physical Exam  GENERAL: NAD  CHEST/LUNG: Unlabored respirations  HEART: Regular rate and rhythm  ABDOMEN: Softly distended, nontender. No rebound or guarding.   NEURO: A&Ox3    Vital Signs Last 24 Hrs  T(C): 36.9 (05 Feb 2021 04:09), Max: 37.2 (04 Feb 2021 20:16)  T(F): 98.4 (05 Feb 2021 04:09), Max: 99 (04 Feb 2021 20:16)  HR: 73 (05 Feb 2021 04:09) (71 - 92)  BP: 140/73 (05 Feb 2021 04:09) (114/86 - 141/84)  BP(mean): --  RR: 18 (05 Feb 2021 04:09) (18 - 18)  SpO2: 97% (05 Feb 2021 04:09) (97% - 99%)    I&O's Detail    04 Feb 2021 07:01  -  05 Feb 2021 07:00  --------------------------------------------------------  IN:    dextrose 5% + sodium chloride 0.45% w/ Additives: 2280 mL    Oral Fluid: 260 mL  Total IN: 2540 mL    OUT:    Voided (mL): 800 mL  Total OUT: 800 mL    Total NET: 1740 mL      MEDICATIONS  (STANDING):  dextrose 5% + sodium chloride 0.45% with potassium chloride 20 mEq/L 1000 milliLiter(s) (125 mL/Hr) IV Continuous <Continuous>  enoxaparin Injectable 40 milliGRAM(s) SubCutaneous daily  influenza   Vaccine 0.5 milliLiter(s) IntraMuscular once  pantoprazole    Tablet 40 milliGRAM(s) Oral daily    MEDICATIONS  (PRN):      LABS:                        12.8   7.32  )-----------( 197      ( 05 Feb 2021 04:11 )             40.5     02-05    139  |  103  |  6<L>  ----------------------------<  104<H>  3.5   |  24  |  0.56    Ca    9.5      05 Feb 2021 07:16  Phos  3.1     02-05  Mg     2.2     02-05      PT/INR - ( 03 Feb 2021 16:56 )   PT: 13.3 sec;   INR: 1.11 ratio

## 2021-02-05 NOTE — CHART NOTE - NSCHARTNOTEFT_GEN_A_CORE
Discussed pending CT colonography with covering Surgical Resident and Radiology Resident.    Per chart review, patient did not receive bowel prep (Magnesium Citrate x 2 and ReadiCAT x 1 - prep per Radiology protocol as informed by Radiology resident). Additionally Radiology Resident concerned about risk of perforation in setting of stent placement and acute diarrhea, and given recent biopsies. CT Tech was instructed not to give oral contrast by the Radiology Resident as protocol for prep was not followed and due to the above concerns.    Per Radiology resident - Body Radiology attendings will be available after 8AM to discuss protocol of CT colonography, timing, and any potential contraindications for procedure.

## 2021-02-05 NOTE — DIETITIAN INITIAL EVALUATION ADULT. - PERTINENT LABORATORY DATA
02-05 @ 07:16: Na 139, BUN 6<L>, Cr 0.56, <H>, K+ 3.5  02-05 @ 04:11: Na 137, BUN 6<L>, Cr 0.51, BG 98, K+ 6.2<HH>, Phos 3.1, Mg 2.2

## 2021-02-05 NOTE — DISCHARGE NOTE PROVIDER - INSTRUCTIONS
Low fiber diet: Avoid raw fruits and vegetables especially ones with thick skin and salad. Thoroughly cooked vegetables that are soft and easily mashed with a fork are ok to eat. Bananas are also ok to eat.

## 2021-02-05 NOTE — DISCHARGE NOTE PROVIDER - CARE PROVIDER_API CALL
Luis Enrique Segovia)  ColonRectal Surgery; Surgery  [facility], [address] [place] [id number]  Phone: [phone]  Fax: [phone]  Follow Up Time: 1-3 days    Benoit Hobson  GASTROENTEROLOGY  [address] [facility], [address] [place] [address]  Phone: [phone]  Fax: [phone]  Follow Up Time: 1 week

## 2021-02-05 NOTE — PROGRESS NOTE ADULT - SUBJECTIVE AND OBJECTIVE BOX
Chief Complaint: Nausea/vomiting  Reason for consult: LBO    Interval Events:   - MRI abdomen with no liver lesion, hemangioma noted    Allergies:  sulfa drugs (Anaphylaxis)    MEDICATIONS  (STANDING):  dextrose 5% + sodium chloride 0.45% with potassium chloride 20 mEq/L 1000 milliLiter(s) (125 mL/Hr) IV Continuous <Continuous>  enoxaparin Injectable 40 milliGRAM(s) SubCutaneous daily  influenza   Vaccine 0.5 milliLiter(s) IntraMuscular once  pantoprazole    Tablet 40 milliGRAM(s) Oral daily    MEDICATIONS  (PRN):    PMHX/PSHX:  Osteoarthrosis    Hypertension    H/O  section    History of cholecystecto   Family history:      ROS:     General:  No wt loss, fevers, chills, night sweats, fatigue,   Eyes:  Good vision, no reported pain  ENT:  No sore throat, pain, runny nose, dysphagia  CV:  No pain, palpitations, hypo/hypertension  Pulm:  No dyspnea, cough, tachypnea, wheezing  GI:  No pain, No nausea, No vomiting, No diarrhea, No constipation, No weight loss, No fever, No pruritis, No rectal bleeding, No tarry stools, No dysphagia  :  No pain, bleeding, incontinence, nocturia  Muscle:  No pain, weakness  Neuro:  No weakness, tingling, memory problems  Psych:  No fatigue, insomnia, mood problems, depression  Endocrine:  No polyuria, polydipsia, cold/heat intolerance  Heme:  No petechiae, ecchymosis, easy bruisability  Skin:  No rash, tattoos, scars, edema    PHYSICAL EXAM:   Vital Signs:  Vital Signs Last 24 Hrs  T(C): 37.5 (2021 09:06), Max: 37.5 (2021 09:06)  T(F): 99.5 (2021 09:06), Max: 99.5 (2021 09:06)  HR: 93 (2021 09:06) (71 - 93)  BP: 115/62 (2021 09:06) (114/86 - 141/84)  BP(mean): --  RR: 18 (2021 09:06) (18 - 18)  SpO2: 97% (2021 09:06) (97% - 99%)    GENERAL:  No acute distress  HEENT:  Normocephalic/atraumatic,  no scleral icterus  CHEST:  Normal effort, no accessory muscle use  ABDOMEN:  Soft, non-tender, + distended (improved)  EXTREMITIES:  No cyanosis, clubbing, or edema  SKIN:  No rash/erythema  NEURO:  Alert and oriented x 3    LABS:                        11.7   6.47  )-----------( 186      ( 2021 07:31 )             37.5     Mean Cell Volume: 81.3 fl (-21 @ 07:31)    -    139  |  105  |  12  ----------------------------<  85  3.7   |  24  |  0.55    Ca    9.0      2021 07:31  Phos  2.7     -  Mg     2.2     -    TPro  7.7  /  Alb  4.4  /  TBili  0.3  /  DBili  x   /  AST  18  /  ALT  26  /  AlkPhos  46  02-02    LIVER FUNCTIONS - ( 2021 18:40 )  Alb: 4.4 g/dL / Pro: 7.7 g/dL / ALK PHOS: 46 U/L / ALT: 26 U/L / AST: 18 U/L / GGT: x           PT/INR - ( 2021 16:56 )   PT: 13.3 sec;   INR: 1.11 ratio      Urinalysis Basic - ( 2021 18:40 )    Color: Light Yellow / Appearance: Clear / S.021 / pH: x  Gluc: x / Ketone: Trace  / Bili: Negative / Urobili: Negative   Blood: x / Protein: Negative / Nitrite: Negative   Leuk Esterase: Negative / RBC: 1 /hpf / WBC 2 /HPF   Sq Epi: x / Non Sq Epi: 2 /hpf / Bacteria: Negative               11.7   6.47  )-----------( 186      ( 2021 07:31 )             37.5                         13.3   8.60  )-----------( 242      ( 2021 07:19 )             42.6                         14.3   8.16  )-----------( 259      ( 2021 18:40 )             46.4     Imaging:    Reviewed

## 2021-02-05 NOTE — PROGRESS NOTE ADULT - ASSESSMENT
A/P: 51F with PMHx of HTN, and chronic constipation presents with 1 day of worsening abd pain associated with one episode of emesis in the setting of recent GI workup. CT w/ evidence of LBO with concurrent SBO and hepatic lesion c/f malignancy s/p stent placement with GI, tolerated well.     - NPO  - clarify plan re: CT enterography w/ Rads and GI  - Operative planning  - F/u pathology  - Appreciate GI recs: Golytely  - Serial abdominal exams   - DVT ppx  - PPI  - F/u AM labs    Red Surgery  [id number]

## 2021-02-05 NOTE — PROGRESS NOTE ADULT - ATTENDING COMMENTS
As above    Large bowel obstruction due to malignant appearing sigmoid stricture, s/p sigmoidoscopy/uncovered metal colonic stent placement with subsequent bowel movements.    Liver lesion appears to be a hemangioma on MRI    Recommendations:    Management per surgery  Advance diet slowly.  Maximum diet while stent in place is low fiber diet to decrease risk of stent occlusion.

## 2021-02-05 NOTE — DIETITIAN INITIAL EVALUATION ADULT. - REASON INDICATOR FOR ASSESSMENT
Pt seen for inadequate diet order x 4 days.  Information obtained from: pt, electronic medical record

## 2021-02-05 NOTE — DIETITIAN INITIAL EVALUATION ADULT. - ORAL INTAKE PTA/DIET HISTORY
Pt reports good appetite and po intake PTA. No specific therapeutic diet followed PTA, however pt endorses she enjoys "healthy" food and normally consumes balanced meals with lean proteins, vegetables, and whole grains. Pt endorses over-eating at times, especially around Holidays and within the past year secondary to COVID-19 pandemic and being home more. No chewing/swallowing difficulties reported. NKFA reported.

## 2021-02-05 NOTE — DISCHARGE NOTE PROVIDER - NSDCMRMEDTOKEN_GEN_ALL_CORE_FT
losartan 25 mg oral tablet: 1 tab(s) orally once a day  metoprolol succinate 50 mg oral tablet, extended release: 1 tab(s) orally once a day  polyethylene glycol 3350 oral powder for reconstitution: 17 gram(s) orally once a day

## 2021-02-05 NOTE — DISCHARGE NOTE PROVIDER - NSDCFUSCHEDAPPT_GEN_ALL_CORE_FT
JONNA PHOENIX ; 02/08/2021 ; ARSENIO [facility] [address] JONNA PHOENIX ; 02/08/2021 ; [facility] [address] [address] Logan ; 02/09/2021 ; A[facility]IO [facility] [place] [address] [facility]

## 2021-02-05 NOTE — DISCHARGE NOTE PROVIDER - NSDCCPTREATMENT_GEN_ALL_CORE_FT
PRINCIPAL PROCEDURE  Procedure: Colonoscopy  Findings and Treatment: 25 mm x 90 mm uncovered self expanding metal stent was deployed across stricture

## 2021-02-05 NOTE — DIETITIAN INITIAL EVALUATION ADULT. - PERTINENT MEDS FT
MEDICATIONS  (STANDING):  enoxaparin Injectable 40 milliGRAM(s) SubCutaneous daily  influenza   Vaccine 0.5 milliLiter(s) IntraMuscular once  losartan 25 milliGRAM(s) Oral daily  metoprolol tartrate 25 milliGRAM(s) Oral two times a day  pantoprazole    Tablet 40 milliGRAM(s) Oral daily  polyethylene glycol 3350 17 Gram(s) Oral daily

## 2021-02-05 NOTE — PROGRESS NOTE ADULT - ATTENDING COMMENTS
Pt feels much better, + flatus and BMs, no abd pain    aaox3  abd soft, NT/ND    MRI with hemangioma    Unclear is CT colonography will get done during this admission, radiology hesitant to do it so soon after stent  will d/w Dr stein re: d/c home and CT as outpt  LRD today Pt feels much better, + flatus and BMs, no abd pain    aaox3  abd soft, NT/ND    MRI with hemangioma    Will hold CT colonography as radiology is hesitant to do it so soon after stent    LRD today  dispo likely tomorrow w/ outpt follow up for OR planning  dvt ppx  f/u path

## 2021-02-05 NOTE — DIETITIAN INITIAL EVALUATION ADULT. - CHIEF COMPLAINT
Per chart, pt is "50 y/o F with hx of HTN and constipation, presenting with nausea/vomiting and abdominal pain, found to have LBO on CT A/P," pt s/p Colonoscopy on 2/3/21 with placement of colonic stent.

## 2021-02-05 NOTE — PROGRESS NOTE ADULT - ASSESSMENT
Impression:    50 y/o F with hx of HTN and constipation, presenting with nausea/vomiting and abdominal pain, found to have LBO on CT A/P; [facility] consulted for colonoscopy w/ colonic stent    # Large bowel obstruction s/p stent placement: Colonoscopy on 2/3/21 with placement of colonic stent. Visualization of infiltrative-appearing, circumferential stricture, concerning for malignancy. Biopsies obtained.  # HTN    Recommendations:  - CT colonography to rule out synchronous lesions (call Radiology to schedule)  - Clear liquid diet  - F/U pathology  - F/U Surgical recommendations  - Rest of care per primary team    Sheng Wright MD  Gastroenterology and Hepatology Fellow  Please contact via [employer] Teams    Please call GI ([phone]) or e-mail [email or web address] if there are any additional questions or concerns, during weekdays from 8 am to 5 pm.     Please call answering service for on-call GI fellow ([phone]) after 5pm and before 8am, and on weekends. Impression:    52 y/o F with hx of HTN and constipation, presenting with nausea/vomiting and abdominal pain, found to have LBO on CT A/P; [facility] consulted for colonoscopy w/ colonic stent    # Large bowel obstruction s/p stent placement: Colonoscopy on 2/3/21 with placement of colonic stent. Visualization of infiltrative-appearing, circumferential stricture, concerning for malignancy. Biopsies obtained.  # HTN    Recommendations:  - Clear liquid diet  - F/U pathology  - F/U Surgical recommendations  - Rest of care per primary team    Sheng Wright MD  Gastroenterology and Hepatology Fellow  Please contact via Microsoft Teams    Please call GI ([phone]) or e-mail [email or web address] if there are any additional questions or concerns, during weekdays from 8 am to 5 pm.     Please call answering service for on-call GI fellow ([phone]) after 5pm and before 8am, and on weekends.

## 2021-02-05 NOTE — DIETITIAN INITIAL EVALUATION ADULT. - ADD RECOMMEND
1. Continue low fiber diet as indicated and monitor for pt tolerance. Can hold off on low sodium restriction at this time. 2. Provide/review nutrition education as indicated 3. Will continue to monitor nutrient intake, wt, labs, f/u per protocol

## 2021-02-05 NOTE — DISCHARGE NOTE PROVIDER - PROVIDER TOKENS
PROVIDER:[TOKEN:[[id number]],FOLLOWUP:[1-3 days]],PROVIDER:[TOKEN:[[id number]],FOLLOWUP:[1 week]]

## 2021-02-06 ENCOUNTER — TRANSCRIPTION ENCOUNTER (OUTPATIENT)
Age: 51
End: 2021-02-06

## 2021-02-06 VITALS
DIASTOLIC BLOOD PRESSURE: 82 MMHG | OXYGEN SATURATION: 98 % | SYSTOLIC BLOOD PRESSURE: 134 MMHG | HEART RATE: 65 BPM | RESPIRATION RATE: 18 BRPM | TEMPERATURE: 98 F

## 2021-02-06 PROCEDURE — 80053 COMPREHEN METABOLIC PANEL: CPT

## 2021-02-06 PROCEDURE — 99285 EMERGENCY DEPT VISIT HI MDM: CPT | Mod: 25

## 2021-02-06 PROCEDURE — 96374 THER/PROPH/DIAG INJ IV PUSH: CPT | Mod: XU

## 2021-02-06 PROCEDURE — U0003: CPT

## 2021-02-06 PROCEDURE — U0005: CPT

## 2021-02-06 PROCEDURE — 85025 COMPLETE CBC W/AUTO DIFF WBC: CPT

## 2021-02-06 PROCEDURE — A9585: CPT

## 2021-02-06 PROCEDURE — C1876: CPT

## 2021-02-06 PROCEDURE — 86900 BLOOD TYPING SEROLOGIC ABO: CPT

## 2021-02-06 PROCEDURE — C1769: CPT

## 2021-02-06 PROCEDURE — 88342 IMHCHEM/IMCYTCHM 1ST ANTB: CPT

## 2021-02-06 PROCEDURE — 86301 IMMUNOASSAY TUMOR CA 19-9: CPT

## 2021-02-06 PROCEDURE — 86769 SARS-COV-2 COVID-19 ANTIBODY: CPT

## 2021-02-06 PROCEDURE — 86901 BLOOD TYPING SEROLOGIC RH(D): CPT

## 2021-02-06 PROCEDURE — 71250 CT THORAX DX C-: CPT

## 2021-02-06 PROCEDURE — 86850 RBC ANTIBODY SCREEN: CPT

## 2021-02-06 PROCEDURE — 83735 ASSAY OF MAGNESIUM: CPT

## 2021-02-06 PROCEDURE — 88305 TISSUE EXAM BY PATHOLOGIST: CPT

## 2021-02-06 PROCEDURE — 96375 TX/PRO/DX INJ NEW DRUG ADDON: CPT

## 2021-02-06 PROCEDURE — 84100 ASSAY OF PHOSPHORUS: CPT

## 2021-02-06 PROCEDURE — 85027 COMPLETE CBC AUTOMATED: CPT

## 2021-02-06 PROCEDURE — 85610 PROTHROMBIN TIME: CPT

## 2021-02-06 PROCEDURE — 85730 THROMBOPLASTIN TIME PARTIAL: CPT

## 2021-02-06 PROCEDURE — 81001 URINALYSIS AUTO W/SCOPE: CPT

## 2021-02-06 PROCEDURE — 82378 CARCINOEMBRYONIC ANTIGEN: CPT

## 2021-02-06 PROCEDURE — 74177 CT ABD & PELVIS W/CONTRAST: CPT

## 2021-02-06 PROCEDURE — 74330 X-RAY BILE/PANC ENDOSCOPY: CPT

## 2021-02-06 PROCEDURE — 88341 IMHCHEM/IMCYTCHM EA ADD ANTB: CPT

## 2021-02-06 PROCEDURE — 71045 X-RAY EXAM CHEST 1 VIEW: CPT

## 2021-02-06 PROCEDURE — 80048 BASIC METABOLIC PNL TOTAL CA: CPT

## 2021-02-06 PROCEDURE — 74183 MRI ABD W/O CNTR FLWD CNTR: CPT

## 2021-02-06 PROCEDURE — 84702 CHORIONIC GONADOTROPIN TEST: CPT

## 2021-02-06 RX ADMIN — Medication 25 MILLIGRAM(S): at 04:51

## 2021-02-06 RX ADMIN — PANTOPRAZOLE SODIUM 40 MILLIGRAM(S): 20 TABLET, DELAYED RELEASE ORAL at 04:51

## 2021-02-06 RX ADMIN — LISINOPRIL 20 MILLIGRAM(S): 2.5 TABLET ORAL at 04:51

## 2021-02-06 NOTE — PROGRESS NOTE ADULT - ASSESSMENT
A/P: 51F with PMHx of HTN, and chronic constipation presents with 1 day of worsening abd pain associated with one episode of emesis in the setting of recent GI workup. CT w/ evidence of LBO with concurrent SBO and hepatic lesion c/f malignancy s/p stent placement with GI, tolerated well.     - Outpatient followup for operative planning  - F/u pathology  - Appreciate GI recs: [facility]  - Serial abdominal exams   - DVT ppx  - PPI  - F/u AM labs  - Home today    [facility]  [id number]

## 2021-02-06 NOTE — DISCHARGE NOTE NURSING/CASE MANAGEMENT/SOCIAL WORK - PATIENT PORTAL LINK FT
You can access the FollowMyHealth Patient Portal offered by [facility] by registering at the following website: [email or web address]. By joining [employer]’s FollowMyHealth portal, you will also be able to view your health information using other applications (apps) compatible with our system.

## 2021-02-06 NOTE — PROGRESS NOTE ADULT - SUBJECTIVE AND OBJECTIVE BOX
Subjective: No new complaints. Pain controlled. +/+. No N/V. No CP/SOB    Objective:  Physical Exam  GENERAL: NAD  CHEST/LUNG: Unlabored respirations  HEART: Regular rate and rhythm  ABDOMEN: Softly distended, nontender. No rebound or guarding.   NEURO: A&Ox3    Vital Signs Last 24 Hrs  T(C): 37 (06 Feb 2021 08:58), Max: 37.2 (05 Feb 2021 20:52)  T(F): 98.6 (06 Feb 2021 08:58), Max: 98.9 (05 Feb 2021 20:52)  HR: 64 (06 Feb 2021 08:58) (64 - 106)  BP: 107/69 (06 Feb 2021 08:58) (107/69 - 137/85)  BP(mean): --  RR: 18 (06 Feb 2021 08:58) (16 - 18)  SpO2: 98% (06 Feb 2021 08:58) (97% - 98%)    I&O's Detail    05 Feb 2021 07:01  -  06 Feb 2021 07:00  --------------------------------------------------------  IN:    dextrose 5% + sodium chloride 0.45% w/ Additives: 625 mL    Oral Fluid: 240 mL  Total IN: 865 mL    OUT:    Voided (mL): 100 mL  Total OUT: 100 mL    Total NET: 765 mL      MEDICATIONS  (STANDING):  enoxaparin Injectable 40 milliGRAM(s) SubCutaneous daily  lisinopril 20 milliGRAM(s) Oral daily  metoprolol tartrate 25 milliGRAM(s) Oral two times a day  pantoprazole    Tablet 40 milliGRAM(s) Oral daily  polyethylene glycol 3350 17 Gram(s) Oral daily    MEDICATIONS  (PRN):      LABS:                        12.8   7.32  )-----------( 197      ( 05 Feb 2021 04:11 )             40.5     02-05    139  |  103  |  6<L>  ----------------------------<  104<H>  3.5   |  24  |  0.56    Ca    9.5      05 Feb 2021 07:16  Phos  3.1     02-05  Mg     2.2     02-05

## 2021-02-07 DIAGNOSIS — Z01.818 ENCOUNTER FOR OTHER PREPROCEDURAL EXAMINATION: ICD-10-CM

## 2021-02-08 ENCOUNTER — APPOINTMENT (OUTPATIENT)
Dept: DISASTER EMERGENCY | Facility: CLINIC | Age: 51
End: 2021-02-08

## 2021-02-09 ENCOUNTER — APPOINTMENT (OUTPATIENT)
Dept: COLORECTAL SURGERY | Facility: CLINIC | Age: 51
End: 2021-02-09
Payer: COMMERCIAL

## 2021-02-09 DIAGNOSIS — C18.9 MALIGNANT NEOPLASM OF COLON, UNSPECIFIED: ICD-10-CM

## 2021-02-09 PROBLEM — I10 ESSENTIAL (PRIMARY) HYPERTENSION: Chronic | Status: ACTIVE | Noted: 2021-02-04

## 2021-02-09 PROBLEM — K59.09 OTHER CONSTIPATION: Chronic | Status: ACTIVE | Noted: 2021-02-04

## 2021-02-09 PROCEDURE — 99213 OFFICE O/P EST LOW 20 MIN: CPT

## 2021-02-09 PROCEDURE — 99072 ADDL SUPL MATRL&STAF TM PHE: CPT

## 2021-02-09 RX ORDER — GABAPENTIN 300 MG/1
300 CAPSULE ORAL
Qty: 30 | Refills: 0 | Status: DISCONTINUED | COMMUNITY
Start: 2019-10-03 | End: 2021-02-09

## 2021-02-09 NOTE — ASSESSMENT
[FreeTextEntry1] : Colon cancer status post colonic stent placement for obstruction\par -I recommended patient undergo laparoscopic-assisted sigmoid colon/low anterior resection. Risks and benefits of the procedure were discussed at length including bleeding, infection, risk of injury to nearby structures, possible anastomotic dehiscence, possible colostomy creation and possible diverting ileostomy creation\par -All questions were answered\par -Enhanced recovery protocol was reviewed\par -Patient will obtain medical optimization\par -Will attempt CT colonoscopy\par -OR scheduling for approximately 2-3 weeks from stent placement to allow for full decompression of the bowel\par -We discussed the possibility of chemotherapy and need for medical oncology evaluation

## 2021-02-09 NOTE — HISTORY OF PRESENT ILLNESS
[FreeTextEntry1] : 51-year-old female with recent hospital admission for obstructing sigmoid colon mass. A colonic stent was placed at That time. She currently reports flatus and bowel movements. No fevers or chills no palpable pain. CT scan showed no obvious metastatic disease. MRI showed a meningioma in the liver. Patient denies family history of colon cancer or inflammatory bowel disease no weight loss or gain

## 2021-02-17 ENCOUNTER — APPOINTMENT (OUTPATIENT)
Dept: COLORECTAL SURGERY | Facility: HOSPITAL | Age: 51
End: 2021-02-17

## 2021-02-19 ENCOUNTER — APPOINTMENT (OUTPATIENT)
Dept: CT IMAGING | Facility: CLINIC | Age: 51
End: 2021-02-19

## 2022-01-29 NOTE — ED ADULT NURSE NOTE - PAIN: PRESENCE, MLM
Can we get her records of her pneumonia vaccine for the [facility]. She also had her colonoscopy done at the [address] [facility].  Can we get records of that. denies pain/discomfort

## 2022-05-17 NOTE — PRE-ANESTHESIA EVALUATION ADULT - NSANTHNECKRD_ENT_A_CORE
PROCEDURES:  Hysteroscopy, with biopsy 17-May-2022 11:09:34  Avi Galo  Removal of endometrial polyp 17-May-2022 11:09:45  Avi Galo  D&C (dilatation and curettage, scraping of uterus) 17-May-2022 11:09:55  Avi Galo   No

## 2022-12-18 ENCOUNTER — NON-APPOINTMENT (OUTPATIENT)
Age: 52
End: 2022-12-18

## 2024-06-09 ENCOUNTER — NON-APPOINTMENT (OUTPATIENT)
Age: 54
End: 2024-06-09

## 2025-02-10 ENCOUNTER — NON-APPOINTMENT (OUTPATIENT)
Age: 55
End: 2025-02-10

## 2025-02-11 ENCOUNTER — NON-APPOINTMENT (OUTPATIENT)
Age: 55
End: 2025-02-11

## 2025-02-11 ENCOUNTER — APPOINTMENT (OUTPATIENT)
Dept: ORTHOPEDIC SURGERY | Facility: CLINIC | Age: 55
End: 2025-02-11
Payer: OTHER MISCELLANEOUS

## 2025-02-11 VITALS — HEIGHT: 64 IN | WEIGHT: 195 LBS | BODY MASS INDEX: 33.29 KG/M2

## 2025-02-11 DIAGNOSIS — S80.00XA CONTUSION OF UNSPECIFIED KNEE, INITIAL ENCOUNTER: ICD-10-CM

## 2025-02-11 DIAGNOSIS — S60.219A CONTUSION OF UNSPECIFIED WRIST, INITIAL ENCOUNTER: ICD-10-CM

## 2025-02-11 DIAGNOSIS — C80.1 MALIGNANT (PRIMARY) NEOPLASM, UNSPECIFIED: ICD-10-CM

## 2025-02-11 PROCEDURE — 73110 X-RAY EXAM OF WRIST: CPT | Mod: 50

## 2025-02-11 PROCEDURE — 73562 X-RAY EXAM OF KNEE 3: CPT | Mod: 50

## 2025-02-11 PROCEDURE — 99204 OFFICE O/P NEW MOD 45 MIN: CPT

## 2025-02-27 ENCOUNTER — APPOINTMENT (OUTPATIENT)
Dept: ORTHOPEDIC SURGERY | Facility: CLINIC | Age: 55
End: 2025-02-27
Payer: OTHER MISCELLANEOUS

## 2025-02-27 DIAGNOSIS — S43.422A SPRAIN OF LEFT ROTATOR CUFF CAPSULE, INITIAL ENCOUNTER: ICD-10-CM

## 2025-02-27 DIAGNOSIS — M54.12 RADICULOPATHY, CERVICAL REGION: ICD-10-CM

## 2025-02-27 DIAGNOSIS — M50.320 OTHER CERVICAL DISC DEGENERATION, MID-CERVICAL REGION, UNSPECIFIED LEVEL: ICD-10-CM

## 2025-02-27 PROCEDURE — 72040 X-RAY EXAM NECK SPINE 2-3 VW: CPT

## 2025-02-27 PROCEDURE — 99214 OFFICE O/P EST MOD 30 MIN: CPT

## 2025-02-27 PROCEDURE — 73030 X-RAY EXAM OF SHOULDER: CPT | Mod: LT

## 2025-02-27 RX ORDER — METHYLPREDNISOLONE 4 MG/1
4 TABLET ORAL
Qty: 1 | Refills: 0 | Status: ACTIVE | COMMUNITY
Start: 2025-02-27 | End: 1900-01-01

## 2025-03-24 ENCOUNTER — APPOINTMENT (OUTPATIENT)
Dept: ORTHOPEDIC SURGERY | Facility: CLINIC | Age: 55
End: 2025-03-24
Payer: OTHER MISCELLANEOUS

## 2025-03-24 DIAGNOSIS — M54.12 RADICULOPATHY, CERVICAL REGION: ICD-10-CM

## 2025-03-24 DIAGNOSIS — S80.00XA CONTUSION OF UNSPECIFIED KNEE, INITIAL ENCOUNTER: ICD-10-CM

## 2025-03-24 DIAGNOSIS — S43.422A SPRAIN OF LEFT ROTATOR CUFF CAPSULE, INITIAL ENCOUNTER: ICD-10-CM

## 2025-03-24 DIAGNOSIS — M50.320 OTHER CERVICAL DISC DEGENERATION, MID-CERVICAL REGION, UNSPECIFIED LEVEL: ICD-10-CM

## 2025-03-24 PROCEDURE — 99213 OFFICE O/P EST LOW 20 MIN: CPT

## 2025-03-25 ENCOUNTER — APPOINTMENT (OUTPATIENT)
Dept: MRI IMAGING | Facility: CLINIC | Age: 55
End: 2025-03-25

## 2025-04-01 ENCOUNTER — APPOINTMENT (OUTPATIENT)
Dept: ORTHOPEDIC SURGERY | Facility: CLINIC | Age: 55
End: 2025-04-01

## 2025-04-09 ENCOUNTER — NON-APPOINTMENT (OUTPATIENT)
Age: 55
End: 2025-04-09

## 2025-04-18 ENCOUNTER — APPOINTMENT (OUTPATIENT)
Dept: ORTHOPEDIC SURGERY | Facility: CLINIC | Age: 55
End: 2025-04-18
Payer: OTHER MISCELLANEOUS

## 2025-04-18 DIAGNOSIS — M50.20 OTHER CERVICAL DISC DISPLACEMENT, UNSPECIFIED CERVICAL REGION: ICD-10-CM

## 2025-04-18 DIAGNOSIS — M54.12 RADICULOPATHY, CERVICAL REGION: ICD-10-CM

## 2025-04-18 DIAGNOSIS — M50.320 OTHER CERVICAL DISC DEGENERATION, MID-CERVICAL REGION, UNSPECIFIED LEVEL: ICD-10-CM

## 2025-04-18 PROCEDURE — 99213 OFFICE O/P EST LOW 20 MIN: CPT

## 2025-05-13 ENCOUNTER — APPOINTMENT (OUTPATIENT)
Dept: ORTHOPEDIC SURGERY | Facility: CLINIC | Age: 55
End: 2025-05-13

## 2025-05-20 ENCOUNTER — APPOINTMENT (OUTPATIENT)
Dept: ORTHOPEDIC SURGERY | Facility: CLINIC | Age: 55
End: 2025-05-20

## 2025-05-22 ENCOUNTER — APPOINTMENT (OUTPATIENT)
Dept: ORTHOPEDIC SURGERY | Facility: CLINIC | Age: 55
End: 2025-05-22

## 2025-07-14 ENCOUNTER — APPOINTMENT (OUTPATIENT)
Dept: ORTHOPEDIC SURGERY | Facility: CLINIC | Age: 55
End: 2025-07-14
Payer: OTHER MISCELLANEOUS

## 2025-07-14 PROBLEM — M22.41 CHONDROMALACIA OF BOTH PATELLAE: Status: ACTIVE | Noted: 2025-07-14

## 2025-07-14 PROCEDURE — 99213 OFFICE O/P EST LOW 20 MIN: CPT

## 2025-09-09 ENCOUNTER — APPOINTMENT (OUTPATIENT)
Dept: ORTHOPEDIC SURGERY | Facility: CLINIC | Age: 55
End: 2025-09-09
Payer: OTHER MISCELLANEOUS

## 2025-09-09 DIAGNOSIS — M22.42 CHONDROMALACIA PATELLAE, RIGHT KNEE: ICD-10-CM

## 2025-09-09 DIAGNOSIS — S80.00XA CONTUSION OF UNSPECIFIED KNEE, INITIAL ENCOUNTER: ICD-10-CM

## 2025-09-09 DIAGNOSIS — M22.41 CHONDROMALACIA PATELLAE, RIGHT KNEE: ICD-10-CM

## 2025-09-09 DIAGNOSIS — M17.12 UNILATERAL PRIMARY OSTEOARTHRITIS, LEFT KNEE: ICD-10-CM

## 2025-09-09 PROCEDURE — 99213 OFFICE O/P EST LOW 20 MIN: CPT
